# Patient Record
Sex: FEMALE | Race: BLACK OR AFRICAN AMERICAN | NOT HISPANIC OR LATINO | URBAN - METROPOLITAN AREA
[De-identification: names, ages, dates, MRNs, and addresses within clinical notes are randomized per-mention and may not be internally consistent; named-entity substitution may affect disease eponyms.]

---

## 2019-10-22 ENCOUNTER — EMERGENCY (EMERGENCY)
Facility: HOSPITAL | Age: 64
LOS: 0 days | Discharge: HOME | End: 2019-10-22
Attending: EMERGENCY MEDICINE
Payer: COMMERCIAL

## 2019-10-22 VITALS
RESPIRATION RATE: 16 BRPM | DIASTOLIC BLOOD PRESSURE: 86 MMHG | WEIGHT: 164.02 LBS | HEART RATE: 90 BPM | OXYGEN SATURATION: 100 % | TEMPERATURE: 99 F | HEIGHT: 62 IN | SYSTOLIC BLOOD PRESSURE: 152 MMHG

## 2019-10-22 VITALS
SYSTOLIC BLOOD PRESSURE: 117 MMHG | OXYGEN SATURATION: 100 % | HEART RATE: 90 BPM | TEMPERATURE: 99 F | DIASTOLIC BLOOD PRESSURE: 76 MMHG | RESPIRATION RATE: 16 BRPM

## 2019-10-22 DIAGNOSIS — I10 ESSENTIAL (PRIMARY) HYPERTENSION: ICD-10-CM

## 2019-10-22 DIAGNOSIS — E89.0 POSTPROCEDURAL HYPOTHYROIDISM: ICD-10-CM

## 2019-10-22 DIAGNOSIS — R10.30 LOWER ABDOMINAL PAIN, UNSPECIFIED: ICD-10-CM

## 2019-10-22 DIAGNOSIS — R11.10 VOMITING, UNSPECIFIED: ICD-10-CM

## 2019-10-22 DIAGNOSIS — E11.9 TYPE 2 DIABETES MELLITUS WITHOUT COMPLICATIONS: ICD-10-CM

## 2019-10-22 DIAGNOSIS — E78.00 PURE HYPERCHOLESTEROLEMIA, UNSPECIFIED: ICD-10-CM

## 2019-10-22 DIAGNOSIS — N20.0 CALCULUS OF KIDNEY: ICD-10-CM

## 2019-10-22 DIAGNOSIS — Z79.84 LONG TERM (CURRENT) USE OF ORAL HYPOGLYCEMIC DRUGS: ICD-10-CM

## 2019-10-22 LAB
ALBUMIN SERPL ELPH-MCNC: 4.2 G/DL — SIGNIFICANT CHANGE UP (ref 3.3–5)
ALP SERPL-CCNC: 129 U/L — HIGH (ref 40–120)
ALT FLD-CCNC: 23 U/L — SIGNIFICANT CHANGE UP (ref 12–78)
ANION GAP SERPL CALC-SCNC: 9 MMOL/L — SIGNIFICANT CHANGE UP (ref 5–17)
APPEARANCE UR: ABNORMAL
AST SERPL-CCNC: 19 U/L — SIGNIFICANT CHANGE UP (ref 15–37)
BACTERIA # UR AUTO: ABNORMAL
BASOPHILS # BLD AUTO: 0.06 K/UL — SIGNIFICANT CHANGE UP (ref 0–0.2)
BASOPHILS NFR BLD AUTO: 0.5 % — SIGNIFICANT CHANGE UP (ref 0–2)
BILIRUB SERPL-MCNC: 0.9 MG/DL — SIGNIFICANT CHANGE UP (ref 0.2–1.2)
BILIRUB UR-MCNC: NEGATIVE — SIGNIFICANT CHANGE UP
BUN SERPL-MCNC: 16 MG/DL — SIGNIFICANT CHANGE UP (ref 7–23)
CALCIUM SERPL-MCNC: 9.6 MG/DL — SIGNIFICANT CHANGE UP (ref 8.5–10.1)
CHLORIDE SERPL-SCNC: 115 MMOL/L — HIGH (ref 96–108)
CO2 SERPL-SCNC: 21 MMOL/L — LOW (ref 22–31)
COLOR SPEC: YELLOW — SIGNIFICANT CHANGE UP
COMMENT - URINE: SIGNIFICANT CHANGE UP
CREAT SERPL-MCNC: 0.92 MG/DL — SIGNIFICANT CHANGE UP (ref 0.5–1.3)
DIFF PNL FLD: ABNORMAL
EOSINOPHIL # BLD AUTO: 0.01 K/UL — SIGNIFICANT CHANGE UP (ref 0–0.5)
EOSINOPHIL NFR BLD AUTO: 0.1 % — SIGNIFICANT CHANGE UP (ref 0–6)
EPI CELLS # UR: SIGNIFICANT CHANGE UP
GLUCOSE BLDC GLUCOMTR-MCNC: 151 MG/DL — HIGH (ref 70–99)
GLUCOSE SERPL-MCNC: 145 MG/DL — HIGH (ref 70–99)
GLUCOSE UR QL: NEGATIVE MG/DL — SIGNIFICANT CHANGE UP
HCT VFR BLD CALC: 40.2 % — SIGNIFICANT CHANGE UP (ref 34.5–45)
HGB BLD-MCNC: 13 G/DL — SIGNIFICANT CHANGE UP (ref 11.5–15.5)
IMM GRANULOCYTES NFR BLD AUTO: 0.3 % — SIGNIFICANT CHANGE UP (ref 0–1.5)
KETONES UR-MCNC: ABNORMAL
LEUKOCYTE ESTERASE UR-ACNC: ABNORMAL
LYMPHOCYTES # BLD AUTO: 1.99 K/UL — SIGNIFICANT CHANGE UP (ref 1–3.3)
LYMPHOCYTES # BLD AUTO: 17.5 % — SIGNIFICANT CHANGE UP (ref 13–44)
MCHC RBC-ENTMCNC: 27.8 PG — SIGNIFICANT CHANGE UP (ref 27–34)
MCHC RBC-ENTMCNC: 32.3 GM/DL — SIGNIFICANT CHANGE UP (ref 32–36)
MCV RBC AUTO: 85.9 FL — SIGNIFICANT CHANGE UP (ref 80–100)
MONOCYTES # BLD AUTO: 0.69 K/UL — SIGNIFICANT CHANGE UP (ref 0–0.9)
MONOCYTES NFR BLD AUTO: 6.1 % — SIGNIFICANT CHANGE UP (ref 2–14)
NEUTROPHILS # BLD AUTO: 8.62 K/UL — HIGH (ref 1.8–7.4)
NEUTROPHILS NFR BLD AUTO: 75.5 % — SIGNIFICANT CHANGE UP (ref 43–77)
NITRITE UR-MCNC: NEGATIVE — SIGNIFICANT CHANGE UP
NRBC # BLD: 0 /100 WBCS — SIGNIFICANT CHANGE UP (ref 0–0)
PH UR: 8 — SIGNIFICANT CHANGE UP (ref 5–8)
PLATELET # BLD AUTO: 368 K/UL — SIGNIFICANT CHANGE UP (ref 150–400)
POTASSIUM SERPL-MCNC: 3.3 MMOL/L — LOW (ref 3.5–5.3)
POTASSIUM SERPL-SCNC: 3.3 MMOL/L — LOW (ref 3.5–5.3)
PROT SERPL-MCNC: 8.1 GM/DL — SIGNIFICANT CHANGE UP (ref 6–8.3)
PROT UR-MCNC: NEGATIVE MG/DL — SIGNIFICANT CHANGE UP
RBC # BLD: 4.68 M/UL — SIGNIFICANT CHANGE UP (ref 3.8–5.2)
RBC # FLD: 12.9 % — SIGNIFICANT CHANGE UP (ref 10.3–14.5)
RBC CASTS # UR COMP ASSIST: ABNORMAL /HPF (ref 0–4)
SODIUM SERPL-SCNC: 145 MMOL/L — SIGNIFICANT CHANGE UP (ref 135–145)
SP GR SPEC: 1.01 — SIGNIFICANT CHANGE UP (ref 1.01–1.02)
UROBILINOGEN FLD QL: NEGATIVE MG/DL — SIGNIFICANT CHANGE UP
WBC # BLD: 11.4 K/UL — HIGH (ref 3.8–10.5)
WBC # FLD AUTO: 11.4 K/UL — HIGH (ref 3.8–10.5)
WBC UR QL: ABNORMAL

## 2019-10-22 PROCEDURE — 99285 EMERGENCY DEPT VISIT HI MDM: CPT

## 2019-10-22 PROCEDURE — 74176 CT ABD & PELVIS W/O CONTRAST: CPT | Mod: 26

## 2019-10-22 RX ORDER — ONDANSETRON 8 MG/1
4 TABLET, FILM COATED ORAL ONCE
Refills: 0 | Status: COMPLETED | OUTPATIENT
Start: 2019-10-22 | End: 2019-10-22

## 2019-10-22 RX ORDER — RIVAROXABAN 15 MG-20MG
15 KIT ORAL ONCE
Refills: 0 | Status: DISCONTINUED | OUTPATIENT
Start: 2019-10-22 | End: 2019-10-22

## 2019-10-22 RX ORDER — SODIUM CHLORIDE 9 MG/ML
1000 INJECTION INTRAMUSCULAR; INTRAVENOUS; SUBCUTANEOUS ONCE
Refills: 0 | Status: COMPLETED | OUTPATIENT
Start: 2019-10-22 | End: 2019-10-22

## 2019-10-22 RX ORDER — MORPHINE SULFATE 50 MG/1
4 CAPSULE, EXTENDED RELEASE ORAL ONCE
Refills: 0 | Status: DISCONTINUED | OUTPATIENT
Start: 2019-10-22 | End: 2019-10-22

## 2019-10-22 RX ORDER — ONDANSETRON 8 MG/1
1 TABLET, FILM COATED ORAL
Qty: 9 | Refills: 0
Start: 2019-10-22 | End: 2019-10-24

## 2019-10-22 RX ORDER — KETOROLAC TROMETHAMINE 30 MG/ML
15 SYRINGE (ML) INJECTION ONCE
Refills: 0 | Status: DISCONTINUED | OUTPATIENT
Start: 2019-10-22 | End: 2019-10-22

## 2019-10-22 RX ORDER — TAMSULOSIN HYDROCHLORIDE 0.4 MG/1
1 CAPSULE ORAL
Qty: 14 | Refills: 0
Start: 2019-10-22 | End: 2019-11-04

## 2019-10-22 RX ORDER — IBUPROFEN 200 MG
1 TABLET ORAL
Qty: 20 | Refills: 0
Start: 2019-10-22 | End: 2019-10-26

## 2019-10-22 RX ADMIN — ONDANSETRON 4 MILLIGRAM(S): 8 TABLET, FILM COATED ORAL at 20:35

## 2019-10-22 RX ADMIN — MORPHINE SULFATE 4 MILLIGRAM(S): 50 CAPSULE, EXTENDED RELEASE ORAL at 21:08

## 2019-10-22 RX ADMIN — Medication 15 MILLIGRAM(S): at 21:10

## 2019-10-22 RX ADMIN — SODIUM CHLORIDE 1000 MILLILITER(S): 9 INJECTION INTRAMUSCULAR; INTRAVENOUS; SUBCUTANEOUS at 22:00

## 2019-10-22 RX ADMIN — MORPHINE SULFATE 4 MILLIGRAM(S): 50 CAPSULE, EXTENDED RELEASE ORAL at 20:38

## 2019-10-22 RX ADMIN — Medication 15 MILLIGRAM(S): at 20:40

## 2019-10-22 RX ADMIN — SODIUM CHLORIDE 1000 MILLILITER(S): 9 INJECTION INTRAMUSCULAR; INTRAVENOUS; SUBCUTANEOUS at 20:40

## 2019-10-22 RX ADMIN — Medication 1 TABLET(S): at 22:12

## 2019-10-22 NOTE — ED ADULT TRIAGE NOTE - CHIEF COMPLAINT QUOTE
pt co right flank pain since this morning. + nausea/vomiting. denies diarrhea. denies dysuria/hematuria. hx. high cholesterol, DM, HTN

## 2019-10-22 NOTE — ED PROVIDER NOTE - PATIENT PORTAL LINK FT
You can access the FollowMyHealth Patient Portal offered by [facility] by registering at the following website: [email or web address]. By joining [employer]’s FollowMyHealth portal, you will also be able to view your health information using other applications (apps) compatible with our system.

## 2019-10-22 NOTE — ED PROVIDER NOTE - PHYSICAL EXAMINATION
Gen: Alert, moderate distress, well appearing  Head: NC, AT, normal lids/conjunctiva  ENT: normal hearing, patent oropharynx without erythema/exudate, uvula midline  Neck: +supple, +Trachea midline  Pulm: Bilateral BS, normal resp effort, no wheeze/stridor/retractions  CV: RRR, no M/R/G, +dist pulses  Abd: soft, NT/ND, Negative [place] signs, +BS, no palpable masses, +right CVA tenderness  Mskel: no edema/erythema/cyanosis  Skin: no rash, warm/dry  Neuro: AAOx3, no apparent sensory/motor deficits, coordination intact

## 2019-10-22 NOTE — ED ADULT NURSE NOTE - SUICIDE SCREENING QUESTION 3
Problem: Patient Care Overview  Goal: Plan of Care Review   09/16/18 1444   Coping/Psychosocial   Plan of Care Reviewed With patient   Plan of Care Review   Progress improving   OTHER   Outcome Summary Pt demonstrates good progress w/mobility. Able to tolerate increased gait distance today despite complaint of dizziness while upright. No new concerns; will continue to increase activity as tolerated.           No

## 2019-10-22 NOTE — ED ADULT NURSE NOTE - OBJECTIVE STATEMENT
pt received c/o right side flank pain onset this morning, pt took BP and BG meds and vomiting all day after eating a small meal. pt reports right glaucoma, left eye lost of peripheral vision, pt cannot recall eye drops taking at home. pt cannot recall BP med at home

## 2019-10-22 NOTE — ED PROVIDER NOTE - OBJECTIVE STATEMENT
Pertinent PMH/PSH/FHx/SHx and Review of Systems contained within:  Patient presents to the ED for right flank pain.  Pain is 10/10, radiating to groin, started this morning at work.  Patient has been having vomiting all day.  Denies dysuria, hematuria, chest pain, dyspnea, diarrhea.  Patient denies EtOH/tobacco/illicit substance use.    ROS: No fever/chills, No headache/photophobia/eye pain/changes in vision, No ear pain/sore throat/dysphagia, No chest pain/palpitations, no SOB/cough/wheeze/stridor, No D/melena, no dysuria/frequency/discharge, No neck pain, no rash, no changes in neurological status/function.

## 2019-10-22 NOTE — ED PROVIDER NOTE - CLINICAL SUMMARY MEDICAL DECISION MAKING FREE TEXT BOX
Patient with right flank pain, found to have large renal calculus.  VSS.  UA suggestive of infection vs contamination, will antibiose.  Pain currently 1/10, sleeping comfortably.  Will DC with pain meds, bactrim, flomax, urology follow up.  Looks well, nontoxic.  Advised to drink plenty of water.  Patient also informed on hypodense lesion of kidney, advised to follow up with PMD.  Discussed results and outcome of today's visit with the patient.  Patient advised to please follow up with another healthcare provider within the next 24 hours and return to the Emergency Department for worsening symptoms or any other concerns.  Patient advised that their doctor may call  to follow up on the specific results of the tests performed today in the emergency department.   Patient appears well on discharge.

## 2019-10-24 LAB
CULTURE RESULTS: SIGNIFICANT CHANGE UP
SPECIMEN SOURCE: SIGNIFICANT CHANGE UP

## 2019-11-04 ENCOUNTER — APPOINTMENT (OUTPATIENT)
Dept: UROLOGY | Facility: CLINIC | Age: 64
End: 2019-11-04
Payer: COMMERCIAL

## 2019-11-04 VITALS
WEIGHT: 164 LBS | BODY MASS INDEX: 30.18 KG/M2 | HEIGHT: 62 IN | DIASTOLIC BLOOD PRESSURE: 75 MMHG | HEART RATE: 83 BPM | SYSTOLIC BLOOD PRESSURE: 126 MMHG

## 2019-11-04 DIAGNOSIS — Z78.9 OTHER SPECIFIED HEALTH STATUS: ICD-10-CM

## 2019-11-04 PROCEDURE — 99204 OFFICE O/P NEW MOD 45 MIN: CPT

## 2019-11-04 RX ORDER — TELMISARTAN AND HYDROCHLOROTHIAZIDE 40; 12.5 MG/1; MG/1
40-12.5 TABLET ORAL
Refills: 0 | Status: ACTIVE | COMMUNITY

## 2019-11-04 RX ORDER — ATORVASTATIN CALCIUM 80 MG/1
TABLET, FILM COATED ORAL
Refills: 0 | Status: ACTIVE | COMMUNITY

## 2019-11-04 NOTE — REVIEW OF SYSTEMS
[Eyesight Problems] : eyesight problems [Heart Rate Is Fast] : fast heart rate [see HPI] : see HPI [both] : pain during and after intercourse [denies] : denies pain with orgasm [Negative] : Heme/Lymph

## 2019-11-09 NOTE — ASSESSMENT
[FreeTextEntry1] : 65 yo F with nephrolithiasis, potential renal mass\par \par - Reviewed labwork and CT imaging. Will need imaging with contrast to accurately delineate this renal mass\par - CT urogram\par - Discussed possible etiologies for nephrolithiasis. Reviewed behavioral modifications including adequate hydration, cutting back on coffee, dark sodas, dark teas, low sodium diet, increasing citrate levels with lemon juice. \par - Discussed importance of seeking medical attention should intractable flank pain with nausea, vomiting or fever occur\par - FU after CT\par

## 2019-11-09 NOTE — HISTORY OF PRESENT ILLNESS
[FreeTextEntry1] : 65 yo F presented to the ER with severe right flank pain\par Some nausea and vomiting\par no fever\par no gross hematuria, no dysuria\par CT showed nephrolithiasis and potential renal mass\par Discharged with antibiotics\par Pain still there but not as severe, last took ibuprofen last night\par Drinks 6 bottles of water, 1 cup of coffee daily\par No prior history of stones

## 2019-12-02 ENCOUNTER — APPOINTMENT (OUTPATIENT)
Age: 64
End: 2019-12-02
Payer: COMMERCIAL

## 2019-12-02 PROCEDURE — 99215 OFFICE O/P EST HI 40 MIN: CPT

## 2019-12-06 ENCOUNTER — RESULT REVIEW (OUTPATIENT)
Age: 64
End: 2019-12-06

## 2019-12-10 NOTE — HISTORY OF PRESENT ILLNESS
[FreeTextEntry1] : 65 yo F presented to the ER with severe right flank pain\par Some nausea and vomiting\par no fever\par no gross hematuria, no dysuria\par CT showed nephrolithiasis and potential renal mass\par Discharged with antibiotics\par Pain still there but not as severe, last took ibuprofen last night\par Drinks 6 bottles of water, 1 cup of coffee daily\par No prior history of stones\par \par 12/2/19 Interval history: Pt feeling much better since last visit\par Some discomfort but no need for pain meds\par Here to review recent CT results

## 2019-12-10 NOTE — ASSESSMENT
[FreeTextEntry1] : 63 yo f with nephrolithiasis and renal mass\par \par - Reviewed CT from [facility] - 1cm stone in right renal pelvis as well as an 8mm stone in the interpole (?calyceal tic). Also with 3.5cm lower pole renal mass\par - Discussed findings with pt and daughter. Will need treatment for stone as well as the mass. For the stone, discussed the pros and cons of PCNL vs ureteroscopy with holmium laser lithotripsy. \par - As for the renal mass, reviewed the pros and cons of surveillance vs perc ablation vs surgical extirpation and the risks and benefits of each option. Also discussed timing in relation to stone management. Would recommend stone procedure prior to treating the renal mass. Reassured pt regarding the risk of waiting to treat the renal mass, given its size. \par \par Addendum: Reviewed imaging with colleagues and consensus was to treat the renal pelvic stone with ureteroscopy and holmium laser lithotripsy prior to partial nephrectomy for the renal mass. Discussed plan with pt and pt eager to proceed.

## 2019-12-10 NOTE — PHYSICAL EXAM
[General Appearance - Well Developed] : well developed [General Appearance - Well Nourished] : well nourished [Normal Appearance] : normal appearance [Well Groomed] : well groomed [General Appearance - In No Acute Distress] : no acute distress [Abdomen Soft] : soft [Costovertebral Angle Tenderness] : no ~M costovertebral angle tenderness [Abdomen Tenderness] : non-tender [Urinary Bladder Findings] : the bladder was normal on palpation [] : no respiratory distress [Edema] : no peripheral edema [Oriented To Time, Place, And Person] : oriented to person, place, and time [Respiration, Rhythm And Depth] : normal respiratory rhythm and effort [Exaggerated Use Of Accessory Muscles For Inspiration] : no accessory muscle use [Affect] : the affect was normal [Mood] : the mood was normal [Not Anxious] : not anxious [Normal Station and Gait] : the gait and station were normal for the patient's age [No Focal Deficits] : no focal deficits [No Palpable Adenopathy] : no palpable adenopathy

## 2020-01-14 ENCOUNTER — APPOINTMENT (OUTPATIENT)
Dept: UROLOGY | Facility: HOSPITAL | Age: 65
End: 2020-01-14

## 2020-01-22 ENCOUNTER — OUTPATIENT (OUTPATIENT)
Dept: OUTPATIENT SERVICES | Facility: HOSPITAL | Age: 65
LOS: 1 days | End: 2020-01-22
Payer: COMMERCIAL

## 2020-01-22 VITALS
HEIGHT: 62 IN | DIASTOLIC BLOOD PRESSURE: 74 MMHG | TEMPERATURE: 98 F | WEIGHT: 158.07 LBS | HEART RATE: 78 BPM | SYSTOLIC BLOOD PRESSURE: 148 MMHG | RESPIRATION RATE: 16 BRPM

## 2020-01-22 DIAGNOSIS — Z01.818 ENCOUNTER FOR OTHER PREPROCEDURAL EXAMINATION: ICD-10-CM

## 2020-01-22 DIAGNOSIS — N20.2 CALCULUS OF KIDNEY WITH CALCULUS OF URETER: ICD-10-CM

## 2020-01-22 DIAGNOSIS — Z98.890 OTHER SPECIFIED POSTPROCEDURAL STATES: Chronic | ICD-10-CM

## 2020-01-22 LAB
ANION GAP SERPL CALC-SCNC: 2 MMOL/L — LOW (ref 5–17)
APPEARANCE UR: CLEAR — SIGNIFICANT CHANGE UP
APTT BLD: 30.7 SEC — SIGNIFICANT CHANGE UP (ref 27.5–36.3)
BACTERIA # UR AUTO: ABNORMAL /HPF
BILIRUB UR-MCNC: NEGATIVE — SIGNIFICANT CHANGE UP
BUN SERPL-MCNC: 7 MG/DL — SIGNIFICANT CHANGE UP (ref 7–18)
CALCIUM SERPL-MCNC: 9.2 MG/DL — SIGNIFICANT CHANGE UP (ref 8.4–10.5)
CHLORIDE SERPL-SCNC: 114 MMOL/L — HIGH (ref 96–108)
CO2 SERPL-SCNC: 27 MMOL/L — SIGNIFICANT CHANGE UP (ref 22–31)
COLOR SPEC: YELLOW — SIGNIFICANT CHANGE UP
CREAT SERPL-MCNC: 0.72 MG/DL — SIGNIFICANT CHANGE UP (ref 0.5–1.3)
DIFF PNL FLD: ABNORMAL
EPI CELLS # UR: SIGNIFICANT CHANGE UP /HPF
GLUCOSE SERPL-MCNC: 85 MG/DL — SIGNIFICANT CHANGE UP (ref 70–99)
GLUCOSE UR QL: NEGATIVE — SIGNIFICANT CHANGE UP
HCT VFR BLD CALC: 38.1 % — SIGNIFICANT CHANGE UP (ref 34.5–45)
HGB BLD-MCNC: 11.8 G/DL — SIGNIFICANT CHANGE UP (ref 11.5–15.5)
HYALINE CASTS # UR AUTO: ABNORMAL /LPF
INR BLD: 1.1 RATIO — SIGNIFICANT CHANGE UP (ref 0.88–1.16)
KETONES UR-MCNC: NEGATIVE — SIGNIFICANT CHANGE UP
LEUKOCYTE ESTERASE UR-ACNC: ABNORMAL
MCHC RBC-ENTMCNC: 27.5 PG — SIGNIFICANT CHANGE UP (ref 27–34)
MCHC RBC-ENTMCNC: 31 GM/DL — LOW (ref 32–36)
MCV RBC AUTO: 88.8 FL — SIGNIFICANT CHANGE UP (ref 80–100)
NITRITE UR-MCNC: NEGATIVE — SIGNIFICANT CHANGE UP
NRBC # BLD: 0 /100 WBCS — SIGNIFICANT CHANGE UP (ref 0–0)
PH UR: 7 — SIGNIFICANT CHANGE UP (ref 5–8)
PLATELET # BLD AUTO: 352 K/UL — SIGNIFICANT CHANGE UP (ref 150–400)
POTASSIUM SERPL-MCNC: 3.1 MMOL/L — LOW (ref 3.5–5.3)
POTASSIUM SERPL-SCNC: 3.1 MMOL/L — LOW (ref 3.5–5.3)
PROT UR-MCNC: 30 MG/DL
PROTHROM AB SERPL-ACNC: 12.3 SEC — SIGNIFICANT CHANGE UP (ref 10–12.9)
RBC # BLD: 4.29 M/UL — SIGNIFICANT CHANGE UP (ref 3.8–5.2)
RBC # FLD: 13.4 % — SIGNIFICANT CHANGE UP (ref 10.3–14.5)
RBC CASTS # UR COMP ASSIST: ABNORMAL /HPF (ref 0–2)
SODIUM SERPL-SCNC: 143 MMOL/L — SIGNIFICANT CHANGE UP (ref 135–145)
SP GR SPEC: 1.01 — SIGNIFICANT CHANGE UP (ref 1.01–1.02)
TSH SERPL-MCNC: 1.44 UU/ML — SIGNIFICANT CHANGE UP (ref 0.34–4.82)
UROBILINOGEN FLD QL: NEGATIVE — SIGNIFICANT CHANGE UP
WBC # BLD: 6.92 K/UL — SIGNIFICANT CHANGE UP (ref 3.8–10.5)
WBC # FLD AUTO: 6.92 K/UL — SIGNIFICANT CHANGE UP (ref 3.8–10.5)
WBC UR QL: ABNORMAL /HPF (ref 0–5)

## 2020-01-22 PROCEDURE — 87086 URINE CULTURE/COLONY COUNT: CPT

## 2020-01-22 PROCEDURE — G0463: CPT

## 2020-01-22 NOTE — H&P PST ADULT - ASSESSMENT
63 y/o female with h/o HTN, DM, and thyroidectomy with diagnois of calculus kidney and ureter right is  scheduled for Right Retrograde pyelogram Right ureteroscopy with lithotripsy and ureteral stent placement on 1/28/2020

## 2020-01-22 NOTE — H&P PST ADULT - NSICDXPROBLEM_GEN_ALL_CORE_FT
PROBLEM DIAGNOSES  Problem: Calculus of kidney and ureter  Assessment and Plan: Cystoscopy, , right retrograde ureteroscopy, with lithotripsyand ureter stent placement

## 2020-01-22 NOTE — H&P PST ADULT - NSICDXPASTMEDICALHX_GEN_ALL_CORE_FT
PAST MEDICAL HISTORY:  Calculus of kidney and ureter     Diabetes     Glaucoma     HTN (Hypertension)     Hypercholesterolemia

## 2020-01-22 NOTE — H&P PST ADULT - HISTORY OF PRESENT ILLNESS
63 y/o female with h/o HTN and well controlled  on medications presented with h/o kidney stones in the right kidney. Pt had severe right flank pain and went to the emergency room, had a CT scan and showed calculus of kidney and ureter. Pt is scheduled for Right Retrograde pyelogram Right ureteroscopy with lithotripsy and ureteral stent placement on 1/28/2020

## 2020-01-22 NOTE — H&P PST ADULT - RS GEN PE MLT RESP DETAILS PC
no chest wall tenderness/normal/breath sounds equal/no intercostal retractions/airway patent/good air movement

## 2020-01-23 LAB
CULTURE RESULTS: SIGNIFICANT CHANGE UP
SPECIMEN SOURCE: SIGNIFICANT CHANGE UP

## 2020-01-27 ENCOUNTER — FORM ENCOUNTER (OUTPATIENT)
Age: 65
End: 2020-01-27

## 2020-01-28 ENCOUNTER — OUTPATIENT (OUTPATIENT)
Dept: OUTPATIENT SERVICES | Facility: HOSPITAL | Age: 65
LOS: 1 days | End: 2020-01-28
Payer: COMMERCIAL

## 2020-01-28 ENCOUNTER — APPOINTMENT (OUTPATIENT)
Age: 65
End: 2020-01-28

## 2020-01-28 ENCOUNTER — RESULT REVIEW (OUTPATIENT)
Age: 65
End: 2020-01-28

## 2020-01-28 VITALS
HEIGHT: 62 IN | TEMPERATURE: 98 F | WEIGHT: 158.07 LBS | RESPIRATION RATE: 16 BRPM | DIASTOLIC BLOOD PRESSURE: 71 MMHG | HEART RATE: 86 BPM | SYSTOLIC BLOOD PRESSURE: 126 MMHG | OXYGEN SATURATION: 99 %

## 2020-01-28 VITALS
HEART RATE: 91 BPM | RESPIRATION RATE: 14 BRPM | TEMPERATURE: 98 F | DIASTOLIC BLOOD PRESSURE: 77 MMHG | SYSTOLIC BLOOD PRESSURE: 138 MMHG

## 2020-01-28 DIAGNOSIS — Z98.890 OTHER SPECIFIED POSTPROCEDURAL STATES: Chronic | ICD-10-CM

## 2020-01-28 DIAGNOSIS — N20.2 CALCULUS OF KIDNEY WITH CALCULUS OF URETER: ICD-10-CM

## 2020-01-28 PROCEDURE — 74420 UROGRAPHY RTRGR +-KUB: CPT | Mod: 26

## 2020-01-28 PROCEDURE — C1889: CPT

## 2020-01-28 PROCEDURE — C2617: CPT

## 2020-01-28 PROCEDURE — 52356 CYSTO/URETERO W/LITHOTRIPSY: CPT | Mod: RT

## 2020-01-28 PROCEDURE — 82365 CALCULUS SPECTROSCOPY: CPT

## 2020-01-28 PROCEDURE — 88300 SURGICAL PATH GROSS: CPT

## 2020-01-28 PROCEDURE — 82962 GLUCOSE BLOOD TEST: CPT

## 2020-01-28 PROCEDURE — C1758: CPT

## 2020-01-28 PROCEDURE — 88300 SURGICAL PATH GROSS: CPT | Mod: 26

## 2020-01-28 PROCEDURE — 76000 FLUOROSCOPY <1 HR PHYS/QHP: CPT

## 2020-01-28 RX ORDER — SODIUM CHLORIDE 9 MG/ML
1000 INJECTION, SOLUTION INTRAVENOUS
Refills: 0 | Status: DISCONTINUED | OUTPATIENT
Start: 2020-01-28 | End: 2020-01-28

## 2020-01-28 RX ORDER — ONDANSETRON 8 MG/1
4 TABLET, FILM COATED ORAL ONCE
Refills: 0 | Status: DISCONTINUED | OUTPATIENT
Start: 2020-01-28 | End: 2020-02-05

## 2020-01-28 RX ORDER — ACETAMINOPHEN 500 MG
1000 TABLET ORAL ONCE
Refills: 0 | Status: DISCONTINUED | OUTPATIENT
Start: 2020-01-28 | End: 2020-02-05

## 2020-01-28 RX ORDER — AZTREONAM 2 G
1 VIAL (EA) INJECTION
Qty: 6 | Refills: 0
Start: 2020-01-28 | End: 2020-01-30

## 2020-01-28 RX ORDER — FENTANYL CITRATE 50 UG/ML
25 INJECTION INTRAVENOUS
Refills: 0 | Status: DISCONTINUED | OUTPATIENT
Start: 2020-01-28 | End: 2020-01-28

## 2020-01-28 RX ORDER — ONDANSETRON 8 MG/1
4 TABLET, FILM COATED ORAL ONCE
Refills: 0 | Status: DISCONTINUED | OUTPATIENT
Start: 2020-01-28 | End: 2020-01-28

## 2020-01-28 RX ORDER — SODIUM CHLORIDE 9 MG/ML
3 INJECTION INTRAMUSCULAR; INTRAVENOUS; SUBCUTANEOUS EVERY 8 HOURS
Refills: 0 | Status: DISCONTINUED | OUTPATIENT
Start: 2020-01-28 | End: 2020-01-28

## 2020-01-28 NOTE — ASU DISCHARGE PLAN (ADULT/PEDIATRIC) - PAIN MANAGEMENT
Take over the counter pain medication Prescriptions electronically submitted to pharmacy from [place]/Take over the counter pain medication

## 2020-01-28 NOTE — BRIEF OPERATIVE NOTE - NSICDXBRIEFPROCEDURE_GEN_ALL_CORE_FT
PROCEDURES:  Retrograde pyelogram 28-Jan-2020 12:12:11  Elizabeth King  Cystoureteroscopy, with nephroscopy, lithotripsy using laser, and ureteral stent insertion 28-Jan-2020 12:12:00  Elizabeth King

## 2020-01-28 NOTE — ASU DISCHARGE PLAN (ADULT/PEDIATRIC) - CARE PROVIDER_API CALL
Eimly Martinez (MD; MPH)  Urology  [address] [place] [address]  [place] [address]  Phone: [phone]  Fax: [phone]  Scheduled Appointment: 02/03/2020

## 2020-01-28 NOTE — ASU DISCHARGE PLAN (ADULT/PEDIATRIC) - ASU DC SPECIAL INSTRUCTIONSFT
- may experience continued burning sensation and bleeding with urination, will resolve in a couple of days  - take over the counter pain medications, ie. tylenol or ibuprofen, as directed  - take antibiotics as instructed   - if increasing lower abdominal pain associated with inability to urinate, prolonged blood in urine associated with dizziness, or fever/chills, seek medical care immediately  - follow up with surgeon on next Monday (2/3), call to schedule/confirm an appointment - may experience continued burning sensation and bleeding with urination, will resolve in a couple of days  - take over the counter pain medications, ie. Tylenol or ibuprofen, as directed  - take antibiotics as instructed   - if increasing lower abdominal pain associated with inability to urinate, prolonged blood in urine associated with dizziness, or fever/chills, seek medical care immediately  - follow up with surgeon on next Monday (2/3), call to schedule/confirm an appointment

## 2020-01-28 NOTE — ASU DISCHARGE PLAN (ADULT/PEDIATRIC) - PROCEDURE
Cystoureteroscopy, laser lithotripsy of right renal calculi, right ureteral stent insertion Cysto-ureteroscopy, laser lithotripsy of right renal calculi, right ureteral stent insertion

## 2020-01-28 NOTE — ASU DISCHARGE PLAN (ADULT/PEDIATRIC) - CALL YOUR DOCTOR IF YOU HAVE ANY OF THE FOLLOWING:
Unable to urinate/Fever greater than (need to indicate Fahrenheit or Celsius) Unable to urinate/Bleeding that does not stop/Fever greater than (need to indicate Fahrenheit or Celsius)

## 2020-01-29 PROBLEM — H40.9 UNSPECIFIED GLAUCOMA: Chronic | Status: ACTIVE | Noted: 2020-01-22

## 2020-01-29 PROBLEM — N20.2 CALCULUS OF KIDNEY WITH CALCULUS OF URETER: Chronic | Status: ACTIVE | Noted: 2020-01-22

## 2020-02-03 ENCOUNTER — APPOINTMENT (OUTPATIENT)
Age: 65
End: 2020-02-03
Payer: COMMERCIAL

## 2020-02-03 VITALS
HEIGHT: 62 IN | SYSTOLIC BLOOD PRESSURE: 140 MMHG | RESPIRATION RATE: 17 BRPM | WEIGHT: 165 LBS | BODY MASS INDEX: 30.36 KG/M2 | HEART RATE: 84 BPM | DIASTOLIC BLOOD PRESSURE: 80 MMHG

## 2020-02-03 PROCEDURE — 52310 CYSTOSCOPY AND TREATMENT: CPT

## 2020-02-06 LAB
APPEARANCE: ABNORMAL
BACTERIA UR CULT: NORMAL
BACTERIA: NEGATIVE
BILIRUBIN URINE: NEGATIVE
BLOOD URINE: ABNORMAL
COLOR: COLORLESS
GLUCOSE QUALITATIVE U: NEGATIVE
GRANULAR CASTS: 1 /LPF
HYALINE CASTS: 2 /LPF
KETONES URINE: NEGATIVE
LEUKOCYTE ESTERASE URINE: ABNORMAL
MICROSCOPIC-UA: NORMAL
NITRITE URINE: NEGATIVE
PH URINE: 7.5
PROTEIN URINE: ABNORMAL
RED BLOOD CELLS URINE: 152 /HPF
SPECIFIC GRAVITY URINE: 1
SQUAMOUS EPITHELIAL CELLS: 2 /HPF
UROBILINOGEN URINE: NORMAL
WHITE BLOOD CELLS URINE: 18 /HPF

## 2020-02-13 ENCOUNTER — APPOINTMENT (OUTPATIENT)
Age: 65
End: 2020-02-13
Payer: COMMERCIAL

## 2020-02-13 VITALS
SYSTOLIC BLOOD PRESSURE: 135 MMHG | WEIGHT: 165 LBS | BODY MASS INDEX: 30.36 KG/M2 | HEART RATE: 94 BPM | DIASTOLIC BLOOD PRESSURE: 71 MMHG | HEIGHT: 62 IN

## 2020-02-13 PROCEDURE — 99214 OFFICE O/P EST MOD 30 MIN: CPT

## 2020-02-16 NOTE — ASSESSMENT
[FreeTextEntry1] : 63 yo F s/p URS/HLL for right kidney stone, right renal mass\par \par - Reviewed options again for renal mass. Discussed risks and benefits of surveillance vs surgical extirpation including partial vs radical nephrectomy. Also discussed open vs minimally invasive. Decision made to proceed with robotic assisted right laparoscopic partial nephrectomy. Will schedule in near future\par - Repeat CT prior to surgery

## 2020-02-16 NOTE — HISTORY OF PRESENT ILLNESS
[FreeTextEntry1] : 63 yo F presented to the ER with severe right flank pain\par Some nausea and vomiting\par no fever\par no gross hematuria, no dysuria\par CT showed nephrolithiasis and potential renal mass\par Discharged with antibiotics\par Pain still there but not as severe, last took ibuprofen last night\par Drinks 6 bottles of water, 1 cup of coffee daily\par No prior history of stones\par \par 12/2/19 Interval history: Pt feeling much better since last visit\par Some discomfort but no need for pain meds\par Here to review recent CT results\par \par 2/13/20 Interval history: Doing well since stent removal\par Did have some flank pain for about 2 days immediately after removal\par Currently feeling well

## 2020-02-16 NOTE — PHYSICAL EXAM
[General Appearance - Well Developed] : well developed [General Appearance - Well Nourished] : well nourished [Normal Appearance] : normal appearance [Well Groomed] : well groomed [General Appearance - In No Acute Distress] : no acute distress [Abdomen Soft] : soft [Abdomen Tenderness] : non-tender [Costovertebral Angle Tenderness] : no ~M costovertebral angle tenderness [Urinary Bladder Findings] : the bladder was normal on palpation [Edema] : no peripheral edema [] : no respiratory distress [Respiration, Rhythm And Depth] : normal respiratory rhythm and effort [Exaggerated Use Of Accessory Muscles For Inspiration] : no accessory muscle use [Oriented To Time, Place, And Person] : oriented to person, place, and time [Not Anxious] : not anxious [Affect] : the affect was normal [Mood] : the mood was normal [No Focal Deficits] : no focal deficits [No Palpable Adenopathy] : no palpable adenopathy [Normal Station and Gait] : the gait and station were normal for the patient's age

## 2020-02-25 ENCOUNTER — OUTPATIENT (OUTPATIENT)
Dept: OUTPATIENT SERVICES | Facility: HOSPITAL | Age: 65
LOS: 1 days | End: 2020-02-25
Payer: COMMERCIAL

## 2020-02-25 VITALS
WEIGHT: 156.09 LBS | HEIGHT: 62 IN | TEMPERATURE: 99 F | SYSTOLIC BLOOD PRESSURE: 144 MMHG | DIASTOLIC BLOOD PRESSURE: 72 MMHG | HEART RATE: 93 BPM | OXYGEN SATURATION: 98 % | RESPIRATION RATE: 16 BRPM

## 2020-02-25 DIAGNOSIS — Z98.890 OTHER SPECIFIED POSTPROCEDURAL STATES: Chronic | ICD-10-CM

## 2020-02-25 DIAGNOSIS — N28.89 OTHER SPECIFIED DISORDERS OF KIDNEY AND URETER: ICD-10-CM

## 2020-02-25 LAB
ALBUMIN SERPL ELPH-MCNC: 4.4 G/DL — SIGNIFICANT CHANGE UP (ref 3.3–5)
ALP SERPL-CCNC: 119 U/L — SIGNIFICANT CHANGE UP (ref 40–120)
ALT FLD-CCNC: 12 U/L — SIGNIFICANT CHANGE UP (ref 4–33)
ANION GAP SERPL CALC-SCNC: 19 MMO/L — HIGH (ref 7–14)
AST SERPL-CCNC: 13 U/L — SIGNIFICANT CHANGE UP (ref 4–32)
BILIRUB SERPL-MCNC: 0.4 MG/DL — SIGNIFICANT CHANGE UP (ref 0.2–1.2)
BUN SERPL-MCNC: 12 MG/DL — SIGNIFICANT CHANGE UP (ref 7–23)
CALCIUM SERPL-MCNC: 9.8 MG/DL — SIGNIFICANT CHANGE UP (ref 8.4–10.5)
CHLORIDE SERPL-SCNC: 105 MMOL/L — SIGNIFICANT CHANGE UP (ref 98–107)
CO2 SERPL-SCNC: 21 MMOL/L — LOW (ref 22–31)
CREAT SERPL-MCNC: 0.81 MG/DL — SIGNIFICANT CHANGE UP (ref 0.5–1.3)
GLUCOSE SERPL-MCNC: 228 MG/DL — HIGH (ref 70–99)
HBA1C BLD-MCNC: 6.4 % — HIGH (ref 4–5.6)
HCT VFR BLD CALC: 38.8 % — SIGNIFICANT CHANGE UP (ref 34.5–45)
HGB BLD-MCNC: 11.3 G/DL — LOW (ref 11.5–15.5)
MCHC RBC-ENTMCNC: 26.7 PG — LOW (ref 27–34)
MCHC RBC-ENTMCNC: 29.1 % — LOW (ref 32–36)
MCV RBC AUTO: 91.5 FL — SIGNIFICANT CHANGE UP (ref 80–100)
NRBC # FLD: 0 K/UL — SIGNIFICANT CHANGE UP (ref 0–0)
PLATELET # BLD AUTO: 353 K/UL — SIGNIFICANT CHANGE UP (ref 150–400)
PMV BLD: 11.1 FL — SIGNIFICANT CHANGE UP (ref 7–13)
POTASSIUM SERPL-MCNC: 2.7 MMOL/L — CRITICAL LOW (ref 3.5–5.3)
POTASSIUM SERPL-SCNC: 2.7 MMOL/L — CRITICAL LOW (ref 3.5–5.3)
PROT SERPL-MCNC: 7.5 G/DL — SIGNIFICANT CHANGE UP (ref 6–8.3)
RBC # BLD: 4.24 M/UL — SIGNIFICANT CHANGE UP (ref 3.8–5.2)
RBC # FLD: 13.7 % — SIGNIFICANT CHANGE UP (ref 10.3–14.5)
SODIUM SERPL-SCNC: 145 MMOL/L — SIGNIFICANT CHANGE UP (ref 135–145)
WBC # BLD: 7.23 K/UL — SIGNIFICANT CHANGE UP (ref 3.8–10.5)
WBC # FLD AUTO: 7.23 K/UL — SIGNIFICANT CHANGE UP (ref 3.8–10.5)

## 2020-02-25 PROCEDURE — 93010 ELECTROCARDIOGRAM REPORT: CPT

## 2020-02-25 RX ORDER — ATORVASTATIN CALCIUM 80 MG/1
0 TABLET, FILM COATED ORAL
Qty: 0 | Refills: 0 | DISCHARGE

## 2020-02-25 RX ORDER — FLUTICASONE PROPIONATE 50 MCG
1 SPRAY, SUSPENSION NASAL
Qty: 0 | Refills: 0 | DISCHARGE

## 2020-02-25 RX ORDER — ACETAZOLAMIDE 250 MG/1
1 TABLET ORAL
Qty: 0 | Refills: 0 | DISCHARGE

## 2020-02-25 RX ORDER — GLIMEPIRIDE 1 MG
0 TABLET ORAL
Qty: 0 | Refills: 0 | DISCHARGE

## 2020-02-25 NOTE — H&P PST ADULT - NEGATIVE BREAST SYMPTOMS
no breast tenderness L/no breast tenderness R/no nipple discharge R/no breast lump L/no nipple discharge L

## 2020-02-25 NOTE — H&P PST ADULT - NEGATIVE ENMT SYMPTOMS
no nasal discharge/no post-nasal discharge/no dry mouth/no vertigo/no sinus symptoms/no hearing difficulty/no abnormal taste sensation/no gum bleeding/no nasal congestion/no ear pain/no nasal obstruction/no tinnitus/no recurrent cold sores/no throat pain/no dysphagia

## 2020-02-25 NOTE — H&P PST ADULT - GASTROINTESTINAL DETAILS
no guarding/no organomegaly/soft/no masses palpable/no bruit/nontender/normal/no rebound tenderness/no rigidity/bowel sounds normal/no distention

## 2020-02-25 NOTE — H&P PST ADULT - NEGATIVE SKIN SYMPTOMS
no itching/no tumor/no hair loss/no rash/no brittle nails/no pitted nails/no dryness/no change in size/color of mole

## 2020-02-25 NOTE — H&P PST ADULT - NEGATIVE GASTROINTESTINAL SYMPTOMS
no nausea/no abdominal pain/no flatulence/no hematochezia/no steatorrhea/no melena/no jaundice/no constipation/no change in bowel habits/no vomiting/no hiccoughs/no diarrhea

## 2020-02-25 NOTE — H&P PST ADULT - NEGATIVE NEUROLOGICAL SYMPTOMS
no difficulty walking/no transient paralysis/no tremors/no vertigo/no loss of sensation/no generalized seizures/no headache/no facial palsy/no focal seizures/no weakness/no paresthesias/no loss of consciousness/no hemiparesis/no syncope/no confusion

## 2020-02-25 NOTE — H&P PST ADULT - HISTORY OF PRESENT ILLNESS
63 y/o female with h/o HTN, Dm, HLD and well controlled  on medications, H/O kidney stones in the right kidney. Pt had severe right flank pain and went to the emergency room, had a CT scan and showed calculus of kidney and ureter as well as a mass to rt kidney. S/P Right Retrograde pyelogram Right ureteroscopy with lithotripsy and ureteral stent placement on 1/28/2020. Now recommended a robotic assisted right laparoscopic partial nephrectomy on 3/3/2020. Preop dx: other specified disorders of kidney and ureter.

## 2020-02-25 NOTE — H&P PST ADULT - NEGATIVE GENERAL GENITOURINARY SYMPTOMS
no urinary hesitancy/no flank pain L/no urine discoloration/no renal colic/no incontinence/no hematuria/no flank pain R/no bladder infections/no dysuria/normal urinary frequency/no nocturia

## 2020-02-25 NOTE — H&P PST ADULT - RS GEN PE MLT RESP DETAILS PC
normal/good air movement/no rhonchi/respirations non-labored/airway patent/clear to auscultation bilaterally/no rales/no wheezes/no subcutaneous emphysema/no chest wall tenderness/no intercostal retractions/breath sounds equal

## 2020-02-25 NOTE — H&P PST ADULT - NEGATIVE MUSCULOSKELETAL SYMPTOMS
no muscle weakness/no neck pain/no arm pain R/no leg pain L/no joint swelling/no leg pain R/no myalgia/no stiffness/no arthralgia/no arthritis/no muscle cramps/no arm pain L/no back pain

## 2020-02-25 NOTE — H&P PST ADULT - ASSESSMENT
65 y/o female with h/o HTN, DM, HLD with Preop dx: other specified disorders of kidney and ureter. Now recommended a robotic assisted right laparoscopic partial nephrectomy on 3/3/2020. 63 y/o female with h/o HTN, DM, HLD with Preop dx: other specified disorders of kidney and ureter. Now recommended a robotic assisted right laparoscopic partial nephrectomy on 3/3/2020.     Plan: Preop instructions provided including npo status, Pepcid and Hibiclens wash. Aware to c/w meds as ordered qam amlodipine and qpm statin, eye drops, vit c and Caltrate as ordered. Aware to stop centrum and any otc meds today. Metformin and Glimepiride continue as ordered and aware to take last doses on 3/2/2020 am, no pm doses and none on 3/3/2020. Verbalized understanding. FS ordered, bw sent, MC done on 2/24/2020 as per pt, requested records, PST to f/u.

## 2020-02-25 NOTE — H&P PST ADULT - NEGATIVE OPHTHALMOLOGIC SYMPTOMS
no irritation L/no diplopia/no discharge R/no pain L/no irritation R/no lacrimation L/no discharge L/no photophobia/no loss of vision R/no scleral injection R/no loss of vision L/no scleral injection L/no blurred vision R/no lacrimation R/no blurred vision L

## 2020-02-25 NOTE — H&P PST ADULT - NEGATIVE PSYCHIATRIC SYMPTOMS
no anxiety/no auditory hallucinations/no memory loss/no mood swings/no agitation/no hyperactivity/no suicidal ideation/no depression/no insomnia/no paranoia/no visual hallucinations

## 2020-02-25 NOTE — H&P PST ADULT - BLOOD TRANSFUSION, PREVIOUS, PROFILE
[de-identified] : We discussed further treatment options. She has significant radiculopathy refractory to 4 months of conservative treatment. We discussed the nature purpose of an L5-S1 decompression for treatment of his symptoms. He will consider this option the let me know how he like to proceed. We have discussed epidural injections as well but he's not interested in this option no

## 2020-02-26 ENCOUNTER — EMERGENCY (EMERGENCY)
Facility: HOSPITAL | Age: 65
LOS: 1 days | Discharge: HOME | End: 2020-02-26
Attending: EMERGENCY MEDICINE | Admitting: EMERGENCY MEDICINE
Payer: COMMERCIAL

## 2020-02-26 VITALS
DIASTOLIC BLOOD PRESSURE: 75 MMHG | SYSTOLIC BLOOD PRESSURE: 126 MMHG | TEMPERATURE: 98 F | RESPIRATION RATE: 18 BRPM | HEIGHT: 62 IN | HEART RATE: 92 BPM | OXYGEN SATURATION: 100 %

## 2020-02-26 DIAGNOSIS — Z98.890 OTHER SPECIFIED POSTPROCEDURAL STATES: Chronic | ICD-10-CM

## 2020-02-26 DIAGNOSIS — E87.6 HYPOKALEMIA: ICD-10-CM

## 2020-02-26 LAB
ANION GAP SERPL CALC-SCNC: 14 MMO/L — SIGNIFICANT CHANGE UP (ref 7–14)
BLD GP AB SCN SERPL QL: NEGATIVE — SIGNIFICANT CHANGE UP
BUN SERPL-MCNC: 15 MG/DL — SIGNIFICANT CHANGE UP (ref 7–23)
CALCIUM SERPL-MCNC: 9 MG/DL — SIGNIFICANT CHANGE UP (ref 8.4–10.5)
CHLORIDE SERPL-SCNC: 110 MMOL/L — HIGH (ref 98–107)
CO2 SERPL-SCNC: 19 MMOL/L — LOW (ref 22–31)
CREAT SERPL-MCNC: 0.78 MG/DL — SIGNIFICANT CHANGE UP (ref 0.5–1.3)
GLUCOSE SERPL-MCNC: 200 MG/DL — HIGH (ref 70–99)
MAGNESIUM SERPL-MCNC: 2 MG/DL — SIGNIFICANT CHANGE UP (ref 1.6–2.6)
PHOSPHATE SERPL-MCNC: 2.6 MG/DL — SIGNIFICANT CHANGE UP (ref 2.5–4.5)
POTASSIUM SERPL-MCNC: 2.9 MMOL/L — CRITICAL LOW (ref 3.5–5.3)
POTASSIUM SERPL-SCNC: 2.9 MMOL/L — CRITICAL LOW (ref 3.5–5.3)
RH IG SCN BLD-IMP: POSITIVE — SIGNIFICANT CHANGE UP
SODIUM SERPL-SCNC: 143 MMOL/L — SIGNIFICANT CHANGE UP (ref 135–145)

## 2020-02-26 PROCEDURE — 99283 EMERGENCY DEPT VISIT LOW MDM: CPT

## 2020-02-26 RX ORDER — POTASSIUM CHLORIDE 20 MEQ
10 PACKET (EA) ORAL
Refills: 0 | Status: COMPLETED | OUTPATIENT
Start: 2020-02-26 | End: 2020-02-26

## 2020-02-26 RX ADMIN — Medication 100 MILLIEQUIVALENT(S): at 22:51

## 2020-02-26 RX ADMIN — Medication 100 MILLIEQUIVALENT(S): at 23:47

## 2020-02-26 RX ADMIN — Medication 100 MILLIEQUIVALENT(S): at 22:00

## 2020-02-26 NOTE — ED ADULT TRIAGE NOTE - CHIEF COMPLAINT QUOTE
Pt was sent in by pre surgical testing for low potassium levels 2.7  Pt has no complaints at this time.

## 2020-02-26 NOTE — ED PROVIDER NOTE - ATTENDING CONTRIBUTION TO CARE
lona: Hx from pt. Had pre-op bloods in prep for surgery to remove renal mass. TOld k was low and sent to ED. pt denies hx of low k in past. not on diuretics.   exam: unremarkable.  labs pre-ED: k=2.7. In past k was as low as 3.1  recc: check k and replete as needed. lona: Hx from pt. Had pre-op bloods in prep for surgery to remove renal mass. TOld k was low and sent to ED. pt denies hx of low k in past. not on diuretics.   exam: unremarkable.  labs pre-ED: k=2.7. In past k was as low as 3.1  recc: check k and replete as needed..

## 2020-02-26 NOTE — ED PROVIDER NOTE - OBJECTIVE STATEMENT
65 yo F with pmhx of HTN, HLD, IDDM presents with outpt hypokalemia without any associated symptoms. labs were performed as part of screening for surgery in March. No hx of similar labs. Denies fever, chills, nausea, vomiting, diarrhea, chest pain, shortness of breath, palpitations, cough, leg swelling, dysuria, numbness/tingling in extremities.

## 2020-02-26 NOTE — ED PROVIDER NOTE - PROGRESS NOTE DETAILS
Resident Gregory: pt informed of the lab results. Instructed to take potassium at home. Return precautions given.

## 2020-02-26 NOTE — ED PROVIDER NOTE - PATIENT PORTAL LINK FT
You can access the FollowMyHealth Patient Portal offered by [facility] by registering at the following website: [email or web address]. By joining [employer]’s FollowMyHealth portal, you will also be able to view your health information using other applications (apps) compatible with our system.

## 2020-02-26 NOTE — ED PROVIDER NOTE - CLINICAL SUMMARY MEDICAL DECISION MAKING FREE TEXT BOX
65 yo F with pmhx of HTN, HLD, IDDM presents with outpt hypokalemia without any associated symptoms. VSS. Repeat labs, replenish potassium if necessary. Likely dispo based on results

## 2020-02-26 NOTE — ED PROVIDER NOTE - PHYSICAL EXAMINATION
Gen: well developed and well nourished, NAD  Eyes: PERRL  ENT: airway patent, mmm  CV: RRR, +S1/S2, no M/R/G  Resp: CTAB, symmetric breath sounds, no W/R/R  GI:  abdomen soft non-distended, NTTP  Extremities - no edema  Neuro: A&Ox3, following commands, speech clear, moving all four extremities spontaneously  Psych: appropriate mood, normal insight

## 2020-02-26 NOTE — ED ADULT TRIAGE NOTE - SPO2 (%)
[facility]  [address], [address]  [place] [address]  [phone]      May 2, 2019      Re: Agatha Canas  [address] [facility] [address]        Agatha has suffered from a long standing wound since the fall of 2013.  She has a wound on her right buttock sized 9 x 6.5 x 3.6 cm with moderate drainage.  Patient needs the prescribed item: dressing aquacel AG extra 4 inches by 5 inches to properly treat and dress the wound.          Sincerely,          Dipti Kasper MD          
100

## 2020-02-26 NOTE — ED PROVIDER NOTE - NSFOLLOWUPINSTRUCTIONS_ED_ALL_ED_FT
1. TAKE ALL MEDICATIONS AS DIRECTED.    2. FOR PAIN OR FEVER YOU CAN TAKE IBUPROFEN (MOTRIN, ADVIL) OR ACETAMINOPHEN (TYLENOL) AS NEEDED, AS DIRECTED ON PACKAGING.  3. FOLLOW UP WITH YOUR PRIMARY DOCTOR WITHIN 5 DAYS AS DIRECTED.  4. IF YOU HAD LABS OR IMAGING DONE, YOU WERE GIVEN COPIES OF ALL LABS AND/OR IMAGING RESULTS FROM YOUR ER VISIT--PLEASE TAKE THEM WITH YOU TO YOUR FOLLOW UP APPOINTMENTS.  5. IF NEEDED, CALL PATIENT ACCESS SERVICES AT [phone] TO FIND A PRIMARY CARE PHYSICIAN.  OR CALL [phone] TO MAKE AN APPOINTMENT WITH THE CLINIC.  6. RETURN TO THE ER FOR ANY WORSENING SYMPTOMS OR CONCERNS.

## 2020-02-27 LAB
ANION GAP SERPL CALC-SCNC: 11 MMO/L — SIGNIFICANT CHANGE UP (ref 7–14)
BUN SERPL-MCNC: 11 MG/DL — SIGNIFICANT CHANGE UP (ref 7–23)
CALCIUM SERPL-MCNC: 8.6 MG/DL — SIGNIFICANT CHANGE UP (ref 8.4–10.5)
CHLORIDE SERPL-SCNC: 113 MMOL/L — HIGH (ref 98–107)
CO2 SERPL-SCNC: 20 MMOL/L — LOW (ref 22–31)
CREAT SERPL-MCNC: 0.71 MG/DL — SIGNIFICANT CHANGE UP (ref 0.5–1.3)
GLUCOSE SERPL-MCNC: 132 MG/DL — HIGH (ref 70–99)
POTASSIUM SERPL-MCNC: 3.2 MMOL/L — LOW (ref 3.5–5.3)
POTASSIUM SERPL-SCNC: 3.2 MMOL/L — LOW (ref 3.5–5.3)
SODIUM SERPL-SCNC: 144 MMOL/L — SIGNIFICANT CHANGE UP (ref 135–145)

## 2020-02-27 RX ORDER — POTASSIUM CHLORIDE 20 MEQ
2 PACKET (EA) ORAL
Qty: 4 | Refills: 0
Start: 2020-02-27 | End: 2020-02-28

## 2020-02-27 RX ORDER — SODIUM CHLORIDE 9 MG/ML
1000 INJECTION INTRAMUSCULAR; INTRAVENOUS; SUBCUTANEOUS ONCE
Refills: 0 | Status: COMPLETED | OUTPATIENT
Start: 2020-02-27 | End: 2020-02-27

## 2020-02-27 RX ADMIN — SODIUM CHLORIDE 1000 MILLILITER(S): 9 INJECTION INTRAMUSCULAR; INTRAVENOUS; SUBCUTANEOUS at 00:50

## 2020-02-27 NOTE — ED ADULT NURSE NOTE - OBJECTIVE STATEMENT
pt received alert and oriented x3. pt was sent in by pmd for low potassium. pt declines any pain.  nsr on cm. 20g placed in right forearm. labs drawn and sent. Call bell in reach, warm blanket provided, bed in lowest position, side rails up x2,safety maintained. will continue to monitor.

## 2020-02-27 NOTE — ED ADULT NURSE NOTE - NSIMPLEMENTINTERV_GEN_ALL_ED
Implemented All Universal Safety Interventions:  [place] to call system. Call bell, personal items and telephone within reach. Instruct patient to call for assistance. Room bathroom lighting operational. Non-slip footwear when patient is off stretcher. Physically safe environment: no spills, clutter or unnecessary equipment. Stretcher in lowest position, wheels locked, appropriate side rails in place.

## 2020-03-02 ENCOUNTER — TRANSCRIPTION ENCOUNTER (OUTPATIENT)
Age: 65
End: 2020-03-02

## 2020-03-03 ENCOUNTER — APPOINTMENT (OUTPATIENT)
Age: 65
End: 2020-03-03

## 2020-03-03 ENCOUNTER — RESULT REVIEW (OUTPATIENT)
Age: 65
End: 2020-03-03

## 2020-03-03 ENCOUNTER — INPATIENT (INPATIENT)
Facility: HOSPITAL | Age: 65
LOS: 1 days | Discharge: HOME | End: 2020-03-05
Attending: UROLOGY | Admitting: UROLOGY
Payer: COMMERCIAL

## 2020-03-03 VITALS
HEIGHT: 62 IN | DIASTOLIC BLOOD PRESSURE: 80 MMHG | OXYGEN SATURATION: 99 % | RESPIRATION RATE: 15 BRPM | HEART RATE: 84 BPM | SYSTOLIC BLOOD PRESSURE: 145 MMHG | TEMPERATURE: 98 F | WEIGHT: 156.09 LBS

## 2020-03-03 DIAGNOSIS — Z98.890 OTHER SPECIFIED POSTPROCEDURAL STATES: Chronic | ICD-10-CM

## 2020-03-03 DIAGNOSIS — Z09 ENCOUNTER FOR FOLLOW-UP EXAMINATION AFTER COMPLETED TREATMENT FOR CONDITIONS OTHER THAN MALIGNANT NEOPLASM: ICD-10-CM

## 2020-03-03 DIAGNOSIS — N28.89 OTHER SPECIFIED DISORDERS OF KIDNEY AND URETER: ICD-10-CM

## 2020-03-03 LAB
ANION GAP SERPL CALC-SCNC: 13 MMO/L — SIGNIFICANT CHANGE UP (ref 7–14)
BUN SERPL-MCNC: 11 MG/DL — SIGNIFICANT CHANGE UP (ref 7–23)
CALCIUM SERPL-MCNC: 8.5 MG/DL — SIGNIFICANT CHANGE UP (ref 8.4–10.5)
CHLORIDE SERPL-SCNC: 108 MMOL/L — HIGH (ref 98–107)
CO2 SERPL-SCNC: 22 MMOL/L — SIGNIFICANT CHANGE UP (ref 22–31)
CREAT SERPL-MCNC: 0.76 MG/DL — SIGNIFICANT CHANGE UP (ref 0.5–1.3)
GLUCOSE BLDV-MCNC: 128 MG/DL — HIGH (ref 70–99)
GLUCOSE SERPL-MCNC: 198 MG/DL — HIGH (ref 70–99)
MAGNESIUM SERPL-MCNC: 1.7 MG/DL — SIGNIFICANT CHANGE UP (ref 1.6–2.6)
PHOSPHATE SERPL-MCNC: 3.8 MG/DL — SIGNIFICANT CHANGE UP (ref 2.5–4.5)
POTASSIUM BLDV-SCNC: 3.2 MMOL/L — LOW (ref 3.4–4.5)
POTASSIUM SERPL-MCNC: 2.8 MMOL/L — CRITICAL LOW (ref 3.5–5.3)
POTASSIUM SERPL-SCNC: 2.8 MMOL/L — CRITICAL LOW (ref 3.5–5.3)
RH IG SCN BLD-IMP: POSITIVE — SIGNIFICANT CHANGE UP
SODIUM SERPL-SCNC: 143 MMOL/L — SIGNIFICANT CHANGE UP (ref 135–145)

## 2020-03-03 PROCEDURE — 88331 PATH CONSLTJ SURG 1 BLK 1SPC: CPT | Mod: 26

## 2020-03-03 PROCEDURE — 50543 LAPARO PARTIAL NEPHRECTOMY: CPT | Mod: RT

## 2020-03-03 PROCEDURE — 88312 SPECIAL STAINS GROUP 1: CPT | Mod: 26

## 2020-03-03 PROCEDURE — 52332 CYSTOSCOPY AND TREATMENT: CPT | Mod: RT

## 2020-03-03 PROCEDURE — 88307 TISSUE EXAM BY PATHOLOGIST: CPT | Mod: 26

## 2020-03-03 PROCEDURE — 88305 TISSUE EXAM BY PATHOLOGIST: CPT | Mod: 26

## 2020-03-03 RX ORDER — DORZOLAMIDE HYDROCHLORIDE TIMOLOL MALEATE 20; 5 MG/ML; MG/ML
1 SOLUTION/ DROPS OPHTHALMIC
Refills: 0 | Status: DISCONTINUED | OUTPATIENT
Start: 2020-03-03 | End: 2020-03-05

## 2020-03-03 RX ORDER — ATORVASTATIN CALCIUM 80 MG/1
20 TABLET, FILM COATED ORAL AT BEDTIME
Refills: 0 | Status: DISCONTINUED | OUTPATIENT
Start: 2020-03-03 | End: 2020-03-05

## 2020-03-03 RX ORDER — BRIMONIDINE TARTRATE 2 MG/MG
1 SOLUTION/ DROPS OPHTHALMIC THREE TIMES A DAY
Refills: 0 | Status: DISCONTINUED | OUTPATIENT
Start: 2020-03-03 | End: 2020-03-04

## 2020-03-03 RX ORDER — SENNA PLUS 8.6 MG/1
2 TABLET ORAL AT BEDTIME
Refills: 0 | Status: DISCONTINUED | OUTPATIENT
Start: 2020-03-03 | End: 2020-03-05

## 2020-03-03 RX ORDER — SODIUM CHLORIDE 9 MG/ML
1000 INJECTION, SOLUTION INTRAVENOUS
Refills: 0 | Status: DISCONTINUED | OUTPATIENT
Start: 2020-03-03 | End: 2020-03-04

## 2020-03-03 RX ORDER — POTASSIUM CHLORIDE 20 MEQ
10 PACKET (EA) ORAL
Refills: 0 | Status: COMPLETED | OUTPATIENT
Start: 2020-03-03 | End: 2020-03-03

## 2020-03-03 RX ORDER — POTASSIUM CHLORIDE 20 MEQ
20 PACKET (EA) ORAL
Refills: 0 | Status: DISCONTINUED | OUTPATIENT
Start: 2020-03-03 | End: 2020-03-05

## 2020-03-03 RX ORDER — INSULIN LISPRO 100/ML
VIAL (ML) SUBCUTANEOUS
Refills: 0 | Status: DISCONTINUED | OUTPATIENT
Start: 2020-03-03 | End: 2020-03-05

## 2020-03-03 RX ORDER — ONDANSETRON 8 MG/1
4 TABLET, FILM COATED ORAL ONCE
Refills: 0 | Status: DISCONTINUED | OUTPATIENT
Start: 2020-03-03 | End: 2020-03-05

## 2020-03-03 RX ORDER — LATANOPROST 0.05 MG/ML
1 SOLUTION/ DROPS OPHTHALMIC; TOPICAL AT BEDTIME
Refills: 0 | Status: DISCONTINUED | OUTPATIENT
Start: 2020-03-03 | End: 2020-03-04

## 2020-03-03 RX ORDER — HYDROMORPHONE HYDROCHLORIDE 2 MG/ML
0.5 INJECTION INTRAMUSCULAR; INTRAVENOUS; SUBCUTANEOUS
Refills: 0 | Status: DISCONTINUED | OUTPATIENT
Start: 2020-03-03 | End: 2020-03-04

## 2020-03-03 RX ORDER — ACETAMINOPHEN 500 MG
650 TABLET ORAL EVERY 6 HOURS
Refills: 0 | Status: DISCONTINUED | OUTPATIENT
Start: 2020-03-03 | End: 2020-03-05

## 2020-03-03 RX ORDER — OXYCODONE AND ACETAMINOPHEN 5; 325 MG/1; MG/1
2 TABLET ORAL EVERY 6 HOURS
Refills: 0 | Status: DISCONTINUED | OUTPATIENT
Start: 2020-03-03 | End: 2020-03-04

## 2020-03-03 RX ORDER — AMLODIPINE BESYLATE 2.5 MG/1
10 TABLET ORAL DAILY
Refills: 0 | Status: DISCONTINUED | OUTPATIENT
Start: 2020-03-03 | End: 2020-03-05

## 2020-03-03 RX ORDER — INSULIN LISPRO 100/ML
VIAL (ML) SUBCUTANEOUS AT BEDTIME
Refills: 0 | Status: DISCONTINUED | OUTPATIENT
Start: 2020-03-03 | End: 2020-03-05

## 2020-03-03 RX ORDER — OXYCODONE AND ACETAMINOPHEN 5; 325 MG/1; MG/1
1 TABLET ORAL EVERY 4 HOURS
Refills: 0 | Status: DISCONTINUED | OUTPATIENT
Start: 2020-03-03 | End: 2020-03-04

## 2020-03-03 RX ORDER — POTASSIUM CHLORIDE 20 MEQ
20 PACKET (EA) ORAL
Refills: 0 | Status: DISCONTINUED | OUTPATIENT
Start: 2020-03-03 | End: 2020-03-03

## 2020-03-03 RX ORDER — HEPARIN SODIUM 5000 [USP'U]/ML
5000 INJECTION INTRAVENOUS; SUBCUTANEOUS EVERY 8 HOURS
Refills: 0 | Status: DISCONTINUED | OUTPATIENT
Start: 2020-03-03 | End: 2020-03-05

## 2020-03-03 RX ADMIN — ATORVASTATIN CALCIUM 20 MILLIGRAM(S): 80 TABLET, FILM COATED ORAL at 21:27

## 2020-03-03 RX ADMIN — Medication 100 MILLIEQUIVALENT(S): at 23:57

## 2020-03-03 RX ADMIN — Medication 100 MILLIEQUIVALENT(S): at 21:10

## 2020-03-03 RX ADMIN — LATANOPROST 1 DROP(S): 0.05 SOLUTION/ DROPS OPHTHALMIC; TOPICAL at 21:19

## 2020-03-03 RX ADMIN — HYDROMORPHONE HYDROCHLORIDE 0.5 MILLIGRAM(S): 2 INJECTION INTRAMUSCULAR; INTRAVENOUS; SUBCUTANEOUS at 18:15

## 2020-03-03 RX ADMIN — HYDROMORPHONE HYDROCHLORIDE 0.5 MILLIGRAM(S): 2 INJECTION INTRAMUSCULAR; INTRAVENOUS; SUBCUTANEOUS at 20:36

## 2020-03-03 RX ADMIN — SODIUM CHLORIDE 125 MILLILITER(S): 9 INJECTION, SOLUTION INTRAVENOUS at 23:54

## 2020-03-03 RX ADMIN — DORZOLAMIDE HYDROCHLORIDE TIMOLOL MALEATE 1 DROP(S): 20; 5 SOLUTION/ DROPS OPHTHALMIC at 23:12

## 2020-03-03 RX ADMIN — Medication 20 MILLIEQUIVALENT(S): at 20:57

## 2020-03-03 RX ADMIN — HYDROMORPHONE HYDROCHLORIDE 0.5 MILLIGRAM(S): 2 INJECTION INTRAMUSCULAR; INTRAVENOUS; SUBCUTANEOUS at 20:26

## 2020-03-03 RX ADMIN — SODIUM CHLORIDE 125 MILLILITER(S): 9 INJECTION, SOLUTION INTRAVENOUS at 23:33

## 2020-03-03 RX ADMIN — AMLODIPINE BESYLATE 10 MILLIGRAM(S): 2.5 TABLET ORAL at 21:19

## 2020-03-03 RX ADMIN — HEPARIN SODIUM 5000 UNIT(S): 5000 INJECTION INTRAVENOUS; SUBCUTANEOUS at 21:19

## 2020-03-03 RX ADMIN — BRIMONIDINE TARTRATE 1 DROP(S): 2 SOLUTION/ DROPS OPHTHALMIC at 21:19

## 2020-03-03 RX ADMIN — Medication 100 MILLIEQUIVALENT(S): at 22:40

## 2020-03-03 RX ADMIN — HYDROMORPHONE HYDROCHLORIDE 0.5 MILLIGRAM(S): 2 INJECTION INTRAMUSCULAR; INTRAVENOUS; SUBCUTANEOUS at 18:35

## 2020-03-03 NOTE — BRIEF OPERATIVE NOTE - NSICDXBRIEFPREOP_GEN_ALL_CORE_FT
PRE-OP DIAGNOSIS:  Renal calculus, right 03-Mar-2020 18:21:08  Ruthie Fu  Renal mass, right 03-Mar-2020 18:19:58  Ruthie Fu

## 2020-03-03 NOTE — PROGRESS NOTE ADULT - SUBJECTIVE AND OBJECTIVE BOX
Subjective  Patient is complaining of mild abdominal pain, no nausea, no vomiting.    Objective    Vital signs  T(F): , Max: 97.9 (03-03-20 @ 17:43)  HR: 96 (03-03-20 @ 20:30)  BP: 137/67 (03-03-20 @ 20:30)  SpO2: 100% (03-03-20 @ 20:30)  Wt(kg): --    Output     03-03 @ 07:01  -  03-03 @ 20:53  --------------------------------------------------------  IN: 250 mL / OUT: 700 mL / NET: -450 mL        Gen: No distress observed  Abd: soft, Mildly distended, appropriately tender, + right KATHERINE serosanguinous, dressing  c/di  : + blanco clear urine    Labs      03-03 @ 18:15    WBC --    / Hct --    / SCr 0.76       Urine Cx: ?  Blood Cx: ?    Imaging

## 2020-03-03 NOTE — BRIEF OPERATIVE NOTE - NSICDXBRIEFPROCEDURE_GEN_ALL_CORE_FT
PROCEDURES:  Cystoscopic insertion of right ureteral stent 03-Mar-2020 18:20:45  Ruthie Fu  Robot-assisted laparoscopic partial right nephrectomy 03-Mar-2020 18:19:47  Ruthie Fu

## 2020-03-03 NOTE — BRIEF OPERATIVE NOTE - NSICDXBRIEFPOSTOP_GEN_ALL_CORE_FT
POST-OP DIAGNOSIS:  Renal calculus, right 03-Mar-2020 18:21:16  Ruthie Fu  Renal mass, right 03-Mar-2020 18:20:06  Ruthie Fu

## 2020-03-04 DIAGNOSIS — N28.89 OTHER SPECIFIED DISORDERS OF KIDNEY AND URETER: ICD-10-CM

## 2020-03-04 DIAGNOSIS — N20.2 CALCULUS OF KIDNEY WITH CALCULUS OF URETER: ICD-10-CM

## 2020-03-04 DIAGNOSIS — I10 ESSENTIAL (PRIMARY) HYPERTENSION: ICD-10-CM

## 2020-03-04 DIAGNOSIS — H40.9 UNSPECIFIED GLAUCOMA: ICD-10-CM

## 2020-03-04 DIAGNOSIS — Z29.9 ENCOUNTER FOR PROPHYLACTIC MEASURES, UNSPECIFIED: ICD-10-CM

## 2020-03-04 DIAGNOSIS — E11.69 TYPE 2 DIABETES MELLITUS WITH OTHER SPECIFIED COMPLICATION: ICD-10-CM

## 2020-03-04 DIAGNOSIS — E78.00 PURE HYPERCHOLESTEROLEMIA, UNSPECIFIED: ICD-10-CM

## 2020-03-04 LAB
ANION GAP SERPL CALC-SCNC: 12 MMO/L — SIGNIFICANT CHANGE UP (ref 7–14)
ANION GAP SERPL CALC-SCNC: 12 MMO/L — SIGNIFICANT CHANGE UP (ref 7–14)
BASOPHILS # BLD AUTO: 0.04 K/UL — SIGNIFICANT CHANGE UP (ref 0–0.2)
BASOPHILS NFR BLD AUTO: 0.6 % — SIGNIFICANT CHANGE UP (ref 0–2)
BUN SERPL-MCNC: 8 MG/DL — SIGNIFICANT CHANGE UP (ref 7–23)
BUN SERPL-MCNC: 9 MG/DL — SIGNIFICANT CHANGE UP (ref 7–23)
CALCIUM SERPL-MCNC: 8.7 MG/DL — SIGNIFICANT CHANGE UP (ref 8.4–10.5)
CALCIUM SERPL-MCNC: 8.8 MG/DL — SIGNIFICANT CHANGE UP (ref 8.4–10.5)
CHLORIDE SERPL-SCNC: 106 MMOL/L — SIGNIFICANT CHANGE UP (ref 98–107)
CHLORIDE SERPL-SCNC: 110 MMOL/L — HIGH (ref 98–107)
CO2 SERPL-SCNC: 21 MMOL/L — LOW (ref 22–31)
CO2 SERPL-SCNC: 22 MMOL/L — SIGNIFICANT CHANGE UP (ref 22–31)
CREAT SERPL-MCNC: 0.74 MG/DL — SIGNIFICANT CHANGE UP (ref 0.5–1.3)
CREAT SERPL-MCNC: 0.75 MG/DL — SIGNIFICANT CHANGE UP (ref 0.5–1.3)
EOSINOPHIL # BLD AUTO: 0.1 K/UL — SIGNIFICANT CHANGE UP (ref 0–0.5)
EOSINOPHIL NFR BLD AUTO: 1.4 % — SIGNIFICANT CHANGE UP (ref 0–6)
GLUCOSE SERPL-MCNC: 144 MG/DL — HIGH (ref 70–99)
GLUCOSE SERPL-MCNC: 169 MG/DL — HIGH (ref 70–99)
HCT VFR BLD CALC: 33 % — LOW (ref 34.5–45)
HGB BLD-MCNC: 10.4 G/DL — LOW (ref 11.5–15.5)
IMM GRANULOCYTES NFR BLD AUTO: 0.1 % — SIGNIFICANT CHANGE UP (ref 0–1.5)
LYMPHOCYTES # BLD AUTO: 1.4 K/UL — SIGNIFICANT CHANGE UP (ref 1–3.3)
LYMPHOCYTES # BLD AUTO: 20 % — SIGNIFICANT CHANGE UP (ref 13–44)
MAGNESIUM SERPL-MCNC: 1.7 MG/DL — SIGNIFICANT CHANGE UP (ref 1.6–2.6)
MCHC RBC-ENTMCNC: 27.4 PG — SIGNIFICANT CHANGE UP (ref 27–34)
MCHC RBC-ENTMCNC: 31.5 % — LOW (ref 32–36)
MCV RBC AUTO: 87.1 FL — SIGNIFICANT CHANGE UP (ref 80–100)
MONOCYTES # BLD AUTO: 0.59 K/UL — SIGNIFICANT CHANGE UP (ref 0–0.9)
MONOCYTES NFR BLD AUTO: 8.4 % — SIGNIFICANT CHANGE UP (ref 2–14)
NEUTROPHILS # BLD AUTO: 4.87 K/UL — SIGNIFICANT CHANGE UP (ref 1.8–7.4)
NEUTROPHILS NFR BLD AUTO: 69.5 % — SIGNIFICANT CHANGE UP (ref 43–77)
NRBC # FLD: 0 K/UL — SIGNIFICANT CHANGE UP (ref 0–0)
PHOSPHATE SERPL-MCNC: 3 MG/DL — SIGNIFICANT CHANGE UP (ref 2.5–4.5)
PLATELET # BLD AUTO: 236 K/UL — SIGNIFICANT CHANGE UP (ref 150–400)
PMV BLD: 10.7 FL — SIGNIFICANT CHANGE UP (ref 7–13)
POTASSIUM SERPL-MCNC: 3.5 MMOL/L — SIGNIFICANT CHANGE UP (ref 3.5–5.3)
POTASSIUM SERPL-MCNC: 3.5 MMOL/L — SIGNIFICANT CHANGE UP (ref 3.5–5.3)
POTASSIUM SERPL-SCNC: 3.5 MMOL/L — SIGNIFICANT CHANGE UP (ref 3.5–5.3)
POTASSIUM SERPL-SCNC: 3.5 MMOL/L — SIGNIFICANT CHANGE UP (ref 3.5–5.3)
RBC # BLD: 3.79 M/UL — LOW (ref 3.8–5.2)
RBC # FLD: 13.7 % — SIGNIFICANT CHANGE UP (ref 10.3–14.5)
SODIUM SERPL-SCNC: 140 MMOL/L — SIGNIFICANT CHANGE UP (ref 135–145)
SODIUM SERPL-SCNC: 143 MMOL/L — SIGNIFICANT CHANGE UP (ref 135–145)
WBC # BLD: 7.01 K/UL — SIGNIFICANT CHANGE UP (ref 3.8–10.5)
WBC # FLD AUTO: 7.01 K/UL — SIGNIFICANT CHANGE UP (ref 3.8–10.5)

## 2020-03-04 PROCEDURE — 99223 1ST HOSP IP/OBS HIGH 75: CPT

## 2020-03-04 PROCEDURE — 99024 POSTOP FOLLOW-UP VISIT: CPT | Mod: AI

## 2020-03-04 RX ORDER — OXYCODONE HYDROCHLORIDE 5 MG/1
10 TABLET ORAL EVERY 4 HOURS
Refills: 0 | Status: DISCONTINUED | OUTPATIENT
Start: 2020-03-04 | End: 2020-03-05

## 2020-03-04 RX ORDER — OXYCODONE HYDROCHLORIDE 5 MG/1
5 TABLET ORAL EVERY 4 HOURS
Refills: 0 | Status: DISCONTINUED | OUTPATIENT
Start: 2020-03-04 | End: 2020-03-05

## 2020-03-04 RX ORDER — BRIMONIDINE TARTRATE 2 MG/MG
1 SOLUTION/ DROPS OPHTHALMIC
Refills: 0 | Status: DISCONTINUED | OUTPATIENT
Start: 2020-03-04 | End: 2020-03-05

## 2020-03-04 RX ORDER — POTASSIUM CHLORIDE 20 MEQ
20 PACKET (EA) ORAL ONCE
Refills: 0 | Status: COMPLETED | OUTPATIENT
Start: 2020-03-04 | End: 2020-03-04

## 2020-03-04 RX ORDER — POLYETHYLENE GLYCOL 3350 17 G/17G
17 POWDER, FOR SOLUTION ORAL DAILY
Refills: 0 | Status: DISCONTINUED | OUTPATIENT
Start: 2020-03-04 | End: 2020-03-05

## 2020-03-04 RX ORDER — SODIUM CHLORIDE 9 MG/ML
1000 INJECTION, SOLUTION INTRAVENOUS
Refills: 0 | Status: DISCONTINUED | OUTPATIENT
Start: 2020-03-04 | End: 2020-03-05

## 2020-03-04 RX ORDER — LATANOPROST 0.05 MG/ML
1 SOLUTION/ DROPS OPHTHALMIC; TOPICAL AT BEDTIME
Refills: 0 | Status: DISCONTINUED | OUTPATIENT
Start: 2020-03-04 | End: 2020-03-05

## 2020-03-04 RX ADMIN — Medication 2: at 08:04

## 2020-03-04 RX ADMIN — BRIMONIDINE TARTRATE 1 DROP(S): 2 SOLUTION/ DROPS OPHTHALMIC at 06:17

## 2020-03-04 RX ADMIN — LATANOPROST 1 DROP(S): 0.05 SOLUTION/ DROPS OPHTHALMIC; TOPICAL at 19:22

## 2020-03-04 RX ADMIN — Medication 20 MILLIEQUIVALENT(S): at 03:22

## 2020-03-04 RX ADMIN — DORZOLAMIDE HYDROCHLORIDE TIMOLOL MALEATE 1 DROP(S): 20; 5 SOLUTION/ DROPS OPHTHALMIC at 19:19

## 2020-03-04 RX ADMIN — Medication 10 MILLIGRAM(S): at 06:38

## 2020-03-04 RX ADMIN — OXYCODONE AND ACETAMINOPHEN 2 TABLET(S): 5; 325 TABLET ORAL at 09:48

## 2020-03-04 RX ADMIN — HEPARIN SODIUM 5000 UNIT(S): 5000 INJECTION INTRAVENOUS; SUBCUTANEOUS at 22:51

## 2020-03-04 RX ADMIN — Medication 2: at 17:12

## 2020-03-04 RX ADMIN — OXYCODONE HYDROCHLORIDE 10 MILLIGRAM(S): 5 TABLET ORAL at 20:37

## 2020-03-04 RX ADMIN — HEPARIN SODIUM 5000 UNIT(S): 5000 INJECTION INTRAVENOUS; SUBCUTANEOUS at 13:31

## 2020-03-04 RX ADMIN — ATORVASTATIN CALCIUM 20 MILLIGRAM(S): 80 TABLET, FILM COATED ORAL at 22:51

## 2020-03-04 RX ADMIN — OXYCODONE AND ACETAMINOPHEN 2 TABLET(S): 5; 325 TABLET ORAL at 09:18

## 2020-03-04 RX ADMIN — Medication 650 MILLIGRAM(S): at 16:45

## 2020-03-04 RX ADMIN — HEPARIN SODIUM 5000 UNIT(S): 5000 INJECTION INTRAVENOUS; SUBCUTANEOUS at 06:18

## 2020-03-04 RX ADMIN — Medication 20 MILLIEQUIVALENT(S): at 09:21

## 2020-03-04 RX ADMIN — BRIMONIDINE TARTRATE 1 DROP(S): 2 SOLUTION/ DROPS OPHTHALMIC at 19:19

## 2020-03-04 RX ADMIN — SODIUM CHLORIDE 75 MILLILITER(S): 9 INJECTION, SOLUTION INTRAVENOUS at 10:37

## 2020-03-04 RX ADMIN — DORZOLAMIDE HYDROCHLORIDE TIMOLOL MALEATE 1 DROP(S): 20; 5 SOLUTION/ DROPS OPHTHALMIC at 06:17

## 2020-03-04 RX ADMIN — Medication 20 MILLIEQUIVALENT(S): at 19:18

## 2020-03-04 RX ADMIN — OXYCODONE HYDROCHLORIDE 10 MILLIGRAM(S): 5 TABLET ORAL at 21:07

## 2020-03-04 RX ADMIN — POLYETHYLENE GLYCOL 3350 17 GRAM(S): 17 POWDER, FOR SOLUTION ORAL at 13:31

## 2020-03-04 RX ADMIN — Medication 10 MILLIGRAM(S): at 19:18

## 2020-03-04 RX ADMIN — SODIUM CHLORIDE 75 MILLILITER(S): 9 INJECTION, SOLUTION INTRAVENOUS at 13:31

## 2020-03-04 RX ADMIN — SODIUM CHLORIDE 125 MILLILITER(S): 9 INJECTION, SOLUTION INTRAVENOUS at 06:37

## 2020-03-04 RX ADMIN — Medication 650 MILLIGRAM(S): at 16:20

## 2020-03-04 RX ADMIN — AMLODIPINE BESYLATE 10 MILLIGRAM(S): 2.5 TABLET ORAL at 06:18

## 2020-03-04 RX ADMIN — SODIUM CHLORIDE 75 MILLILITER(S): 9 INJECTION, SOLUTION INTRAVENOUS at 22:52

## 2020-03-04 NOTE — CONSULT NOTE ADULT - PROBLEM SELECTOR RECOMMENDATION 2
s/p R ureteroscopy with lithotripsy and ureteral stent placement on 1/28/2020  Now s/p lap partial nephrectomy   Post-management as above

## 2020-03-04 NOTE — PROGRESS NOTE ADULT - ASSESSMENT
63 yo F underwent RAL right partial npehrectomy, ureteral stent placement    -AM labs reviewed  -replete Mg, K  -IVM  -continue CLD, monitor GI function  -d/c blanco monitor GI function  -DVT prophy, IS, OOB, ambulate 63 yo F underwent RAL right partial npehrectomy, ureteral stent placement    -AM labs reviewed  -IVM  -continue CLD, monitor GI function, AM dulcolax  -d/c blanco monitor GI function  -DVT prophy, IS, OOB, ambulate

## 2020-03-04 NOTE — PROGRESS NOTE ADULT - SUBJECTIVE AND OBJECTIVE BOX
Overnight events:  None    Subjective:  Pt offers no complaints, tolerating po, no flatus yet, not OOB yet    Objective:    Vital signs  T(C): , Max: 37.2 (03-04-20 @ 06:09)  HR: 85 (03-04-20 @ 06:09)  BP: 130/62 (03-04-20 @ 06:09)  SpO2: 95% (03-04-20 @ 06:09)  Wt(kg): --    Output   Francis: 2245  KATHERINE: 47.5  03-03 @ 07:01  -  03-04 @ 07:00  --------------------------------------------------------  IN: 2390 mL / OUT: 2842.5 mL / NET: -452.5 mL        Gen: NAD  Abd: gurwinderis c/d/i, soft, nontender, nondistended  : francis removed on [employer]    Labs                        10.4   7.01  )-----------( 236      ( 04 Mar 2020 05:45 )             33.0     04 Mar 2020 05:45    143    |  110    |  8      ----------------------------<  144    3.5     |  21     |  0.74     Ca    8.8        04 Mar 2020 05:45  Phos  3.0       04 Mar 2020 01:50  Mg     1.7       04 Mar 2020 01:50

## 2020-03-04 NOTE — PROGRESS NOTE ADULT - ATTENDING COMMENTS
Pt seen and examined. Agree with note as written above. Pt with minimal complaints    - Ambulate  - Encourage incentive spirometry  - Advance diet as tolerated  - DC planning

## 2020-03-04 NOTE — CONSULT NOTE ADULT - PROBLEM SELECTOR RECOMMENDATION 4
Hgb A1c 6.4   Continue insulin sliding scale   Hold Lantus for now, she is only drinking water at this time   Monitor fingersticks

## 2020-03-04 NOTE — CONSULT NOTE ADULT - PROBLEM SELECTOR RECOMMENDATION 9
s/p robotic assisted right laparoscopic partial nephrectomy and R ureteral stent on 3/3/2020  pain control   Awaiting GI function, bowel regimen   Incentive spirometry   Vega removed

## 2020-03-04 NOTE — CONSULT NOTE ADULT - PROBLEM SELECTOR PROBLEM 7
66 y/o F with CAD, HTN, hx of gastric ulcers and occult positive anemia, admitted for syncope workup; now being evaluated for decreasing h/h in the absence of melena or hematochezia 66 y/o F with CAD, HTN, hx of gastric ulcers and occult positive anemia, admitted for syncope workup; now being evaluated for decreasing h/h in the absence of melena or hematochezia 66 y/o F with CAD, HTN, hx of gastric ulcers and occult positive anemia, admitted for syncope workup; now being evaluated for decreasing h/h in the absence of melena or hematochezia Need for prophylactic measure

## 2020-03-04 NOTE — CONSULT NOTE ADULT - ASSESSMENT
63 y/o female with h/o HTN, DM, HLD, kidney stones, and recently diagnosed R renal mass, admitted for robotic assisted right laparoscopic partial nephrectomy and R ureteral stent placement.

## 2020-03-04 NOTE — CONSULT NOTE ADULT - SUBJECTIVE AND OBJECTIVE BOX
HPI:  63 y/o female with h/o HTN, DM, HLD, kidney stones, and recently diagnosed R renal mass, admitted for robotic assisted right laparoscopic partial nephrectomy and R ureteral stent placement.  Pt had severe right flank pain in October and went to the emergency room, had a CT scan that showed calculus of kidney and ureter as well as a mass to R kidney. Patient the underwent a R retrograde pyelogram and R ureteroscopy with lithotripsy and ureteral stent placement on 1/28/2020. Now she is s/p a robotic assisted right laparoscopic partial nephrectomy and R ureteral stent on 3/3/2020. Patient is currently c/o post-op pain in R abdomen and flank. The pain is constant, dull, non-radiating, 8 out of 10 on pain scale at its worst, worse w/ movement and palpation, improves w/ Oxycodone.     PAST MEDICAL & SURGICAL HISTORY:  Glaucoma  Calculus of kidney and ureter  Hypercholesterolemia  HTN (Hypertension)  Diabetes  History of lithotripsy: 1/2020  H/O eye surgery: Right  S/P Thyroidectomy      Review of Systems:   CONSTITUTIONAL: No fever, weight loss, or fatigue  EYES: No eye pain, visual disturbances, or discharge  ENMT:  No difficulty hearing, tinnitus, vertigo; No sinus or throat pain  NECK: No pain or stiffness  BREASTS: No pain, masses, or nipple discharge  RESPIRATORY: No cough, wheezing, chills or hemoptysis; No shortness of breath  CARDIOVASCULAR: No chest pain, palpitations, dizziness, or leg swelling  GASTROINTESTINAL: +abdominal/flank pain. No nausea, vomiting, or hematemesis; No diarrhea or constipation. No melena or hematochezia.  GENITOURINARY: No dysuria, frequency, hematuria, or incontinence  NEUROLOGICAL: No headaches, memory loss, loss of strength, numbness, or tremors  SKIN: No itching, burning, rashes, or lesions   LYMPH NODES: No enlarged glands  ENDOCRINE: No heat or cold intolerance; No hair loss  MUSCULOSKELETAL: No joint pain or swelling; No muscle, back, or extremity pain  PSYCHIATRIC: No depression, anxiety, mood swings, or difficulty sleeping  HEME/LYMPH: No easy bruising, or bleeding gums  ALLERY AND IMMUNOLOGIC: No hives or eczema    Allergies    No Known Allergies    Intolerances    Social History:      Never smoked   Occasionally drinks wine     FAMILY HISTORY:  FH: HTN (hypertension)      HOME MEDICATIONS:  metFORMIN 1000 mg oral tablet: Last Dose Taken:  , 1 tab(s) orally 2 times a day  amLODIPine 10 mg oral tablet: Last Dose Taken:  , 1 tab(s) orally once a day  insulin glargine 100 units/mL subcutaneous solution: Last Dose Taken:  , 10-20 unit(s) subcutaneous once a day (at bedtime)  Centrum Silver oral tablet: Last Dose Taken:  , 1 tab(s) orally once a day  Caltrate 600 mg oral tablet: Last Dose Taken:  , 2 tab(s) orally once a day  glimepiride: Last Dose Taken:  , 2  orally once a day  atorvastatin: Last Dose Taken:  , 20  orally once a day (at bedtime)  latanoprost 0.005% ophthalmic solution: Last Dose Taken:  , 1 drop(s) to each affected eye once a day (in the evening)  Vitamin C 500 mg oral tablet: Last Dose Taken:  , 1 tab(s) orally once a day  Cosopt 2.23%-0.68% ophthalmic solution: Last Dose Taken:  , 1 drop(s) to each affected eye 2 times a day  Alphagan P 0.15% ophthalmic solution: Last Dose Taken:  , 1 drop(s) to each affected eye 3 times a day    Vital Signs Last 24 Hrs  T(C): 36.8 (04 Mar 2020 09:50), Max: 37.2 (04 Mar 2020 06:09)  T(F): 98.2 (04 Mar 2020 09:50), Max: 99 (04 Mar 2020 06:09)  HR: 83 (04 Mar 2020 09:50) (81 - 98)  BP: 114/67 (04 Mar 2020 09:50) (96/48 - 150/77)  BP(mean): 82 (04 Mar 2020 03:00) (56 - 94)  RR: 18 (04 Mar 2020 09:50) (11 - 29)  SpO2: 100% (04 Mar 2020 09:50) (92% - 100%)  CAPILLARY BLOOD GLUCOSE      POCT Blood Glucose.: 126 mg/dL (04 Mar 2020 11:51)  POCT Blood Glucose.: 162 mg/dL (04 Mar 2020 07:58)  POCT Blood Glucose.: 166 mg/dL (03 Mar 2020 21:17)    I&O's Summary    03 Mar 2020 07:01  -  04 Mar 2020 07:00  --------------------------------------------------------  IN: 2390 mL / OUT: 2842.5 mL / NET: -452.5 mL    04 Mar 2020 07:01  -  04 Mar 2020 12:52  --------------------------------------------------------  IN: 275 mL / OUT: 10 mL / NET: 265 mL      PHYSICAL EXAM:  GENERAL: NAD, well-developed  EYES: EOMI, PERRLA, conjunctiva and sclera clear  NECK: Supple, No JVD  CHEST/LUNG: Clear to auscultation bilaterally; No wheeze  HEART: Regular rate and rhythm; No murmurs, rubs, or gallops  ABDOMEN: Soft, appropriately tender, Nondistended; Bowel sounds present  EXTREMITIES:  2+ Peripheral Pulses, No clubbing, cyanosis, or edema  PSYCH: AAOx3  NEUROLOGY: non-focal  SKIN: No rashes or lesions    LABS:                        10.4   7.01  )-----------( 236      ( 04 Mar 2020 05:45 )             33.0     03-04    143  |  110<H>  |  8   ----------------------------<  144<H>  3.5   |  21<L>  |  0.74    Ca    8.8      04 Mar 2020 05:45  Phos  3.0     03-04  Mg     1.7     03-04                RADIOLOGY & ADDITIONAL TESTS:    Imaging Personally Reviewed:  CT abdomen October 2019 reviewed:   Moderate right-sided hydronephrosis and mild perinephric stranding   secondary to a 10 mm calculus at the right ureteropelvic junction.   Additional nonobstructing right intrarenal calculi measuring up to 6 mm.    Indeterminate exophytic lesion arising from the lower pole the right   kidney lobulated contours measuring up to 4.1 cm which is relatively   isodense to the kidney.     Consultant(s) Notes Reviewed:      Care Discussed with Consultants/Other Providers: HPI:  63 y/o female with h/o HTN, DM, HLD, kidney stones, and recently diagnosed R renal mass, admitted for robotic assisted right laparoscopic partial nephrectomy and R ureteral stent placement.  Pt had severe right flank pain in October and went to the emergency room, had a CT scan that showed calculus of kidney and ureter as well as a mass to R kidney. Patient the underwent a R retrograde pyelogram and R ureteroscopy with lithotripsy and ureteral stent placement on 1/28/2020. Now she is s/p a robotic assisted right laparoscopic partial nephrectomy and R ureteral stent on 3/3/2020. Patient is currently c/o post-op pain in R abdomen and flank. The pain is constant, dull, non-radiating, 8 out of 10 on pain scale at its worst, worse w/ movement and palpation, improves w/ Oxycodone.     PAST MEDICAL & SURGICAL HISTORY:  Glaucoma  Calculus of kidney and ureter  Hypercholesterolemia  HTN (Hypertension)  Diabetes  History of lithotripsy: 1/2020  H/O eye surgery: Right  S/P Thyroidectomy      Review of Systems:   CONSTITUTIONAL: No fever, weight loss, or fatigue  EYES: No eye pain, visual disturbances, or discharge  ENMT:  No difficulty hearing, tinnitus, vertigo; No sinus or throat pain  NECK: No pain or stiffness  BREASTS: No pain, masses, or nipple discharge  RESPIRATORY: No cough, wheezing, chills or hemoptysis; No shortness of breath  CARDIOVASCULAR: No chest pain, palpitations, dizziness, or leg swelling  GASTROINTESTINAL: +abdominal/flank pain. No nausea, vomiting, or hematemesis; No diarrhea or constipation. No melena or hematochezia.  GENITOURINARY: No dysuria, frequency, hematuria, or incontinence  NEUROLOGICAL: No headaches, memory loss, loss of strength, numbness, or tremors  SKIN: No itching, burning, rashes, or lesions   LYMPH NODES: No enlarged glands  ENDOCRINE: No heat or cold intolerance; No hair loss  MUSCULOSKELETAL: No joint pain or swelling; No muscle, back, or extremity pain  PSYCHIATRIC: No depression, anxiety, mood swings, or difficulty sleeping  HEME/LYMPH: No easy bruising, or bleeding gums  ALLERY AND IMMUNOLOGIC: No hives or eczema    Allergies    No Known Allergies    Intolerances    Social History:      Never smoked   Occasionally drinks wine     FAMILY HISTORY:  FH: HTN (hypertension)      HOME MEDICATIONS:  metFORMIN 1000 mg oral tablet: Last Dose Taken:  , 1 tab(s) orally 2 times a day  amLODIPine 10 mg oral tablet: Last Dose Taken:  , 1 tab(s) orally once a day  insulin glargine 100 units/mL subcutaneous solution: Last Dose Taken:  , 10-20 unit(s) subcutaneous once a day (at bedtime)  Centrum Silver oral tablet: Last Dose Taken:  , 1 tab(s) orally once a day  Caltrate 600 mg oral tablet: Last Dose Taken:  , 2 tab(s) orally once a day  glimepiride: Last Dose Taken:  , 2  orally once a day  atorvastatin: Last Dose Taken:  , 20  orally once a day (at bedtime)  latanoprost 0.005% ophthalmic solution: Last Dose Taken:  , 1 drop(s) to each affected eye once a day (in the evening)  Vitamin C 500 mg oral tablet: Last Dose Taken:  , 1 tab(s) orally once a day  Cosopt 2.23%-0.68% ophthalmic solution: Last Dose Taken:  , 1 drop(s) to each affected eye 2 times a day  Alphagan P 0.15% ophthalmic solution: Last Dose Taken:  , 1 drop(s) to each affected eye 3 times a day    Vital Signs Last 24 Hrs  T(C): 36.8 (04 Mar 2020 09:50), Max: 37.2 (04 Mar 2020 06:09)  T(F): 98.2 (04 Mar 2020 09:50), Max: 99 (04 Mar 2020 06:09)  HR: 83 (04 Mar 2020 09:50) (81 - 98)  BP: 114/67 (04 Mar 2020 09:50) (96/48 - 150/77)  BP(mean): 82 (04 Mar 2020 03:00) (56 - 94)  RR: 18 (04 Mar 2020 09:50) (11 - 29)  SpO2: 100% (04 Mar 2020 09:50) (92% - 100%)  CAPILLARY BLOOD GLUCOSE      POCT Blood Glucose.: 126 mg/dL (04 Mar 2020 11:51)  POCT Blood Glucose.: 162 mg/dL (04 Mar 2020 07:58)  POCT Blood Glucose.: 166 mg/dL (03 Mar 2020 21:17)    I&O's Summary    03 Mar 2020 07:01  -  04 Mar 2020 07:00  --------------------------------------------------------  IN: 2390 mL / OUT: 2842.5 mL / NET: -452.5 mL    04 Mar 2020 07:01  -  04 Mar 2020 12:52  --------------------------------------------------------  IN: 275 mL / OUT: 10 mL / NET: 265 mL      PHYSICAL EXAM:  GENERAL: NAD, well-developed  EYES: EOMI, PERRLA, conjunctiva and sclera clear  NECK: Supple, No JVD  CHEST/LUNG: Clear to auscultation bilaterally; No wheeze  HEART: Regular rate and rhythm; No murmurs, rubs, or gallops  ABDOMEN: Soft, appropriately tender, Nondistended; Bowel sounds present  EXTREMITIES:  2+ Peripheral Pulses, No clubbing, cyanosis, or edema  PSYCH: AAOx3  NEUROLOGY: non-focal  SKIN: No rashes or lesions    LABS:                        10.4   7.01  )-----------( 236      ( 04 Mar 2020 05:45 )             33.0     03-04    143  |  110<H>  |  8   ----------------------------<  144<H>  3.5   |  21<L>  |  0.74    Ca    8.8      04 Mar 2020 05:45  Phos  3.0     03-04  Mg     1.7     03-04                RADIOLOGY & ADDITIONAL TESTS:    Imaging Personally Reviewed:  CT abdomen October 2019 reviewed:   Moderate right-sided hydronephrosis and mild perinephric stranding   secondary to a 10 mm calculus at the right ureteropelvic junction.   Additional nonobstructing right intrarenal calculi measuring up to 6 mm.    Indeterminate exophytic lesion arising from the lower pole the right   kidney lobulated contours measuring up to 4.1 cm which is relatively   isodense to the kidney.     Consultant(s) Notes Reviewed:      Care Discussed with Consultants/Other Providers: Spoke w/ urology PA, will hold [employer] for now HPI:  63 y/o female with h/o HTN, DM, HLD, kidney stones, and recently diagnosed R renal mass, admitted for robotic assisted right laparoscopic partial nephrectomy and R ureteral stent placement.  Pt had severe right flank pain in October and went to the emergency room, had a CT scan that showed calculus of kidney and ureter as well as a mass to R kidney. Patient the underwent a R retrograde pyelogram and R ureteroscopy with lithotripsy and ureteral stent placement on 1/28/2020. Now she is s/p a robotic assisted right laparoscopic partial nephrectomy and R ureteral stent on 3/3/2020. Patient is currently c/o post-op pain in R abdomen and flank. The pain is constant, dull, non-radiating, 8 out of 10 on pain scale at its worst, worse w/ movement and palpation, improves w/ Oxycodone.     PAST MEDICAL & SURGICAL HISTORY:  Glaucoma  Calculus of kidney and ureter  Hypercholesterolemia  HTN (Hypertension)  Diabetes  History of lithotripsy: 1/2020  H/O eye surgery: Right  S/P Thyroidectomy      Review of Systems:   CONSTITUTIONAL: No fever, weight loss, or fatigue  EYES: No eye pain, visual disturbances, or discharge  ENMT:  No difficulty hearing, tinnitus, vertigo; No sinus or throat pain  NECK: No pain or stiffness  BREASTS: No pain, masses, or nipple discharge  RESPIRATORY: No cough, wheezing, chills or hemoptysis; No shortness of breath  CARDIOVASCULAR: No chest pain, palpitations, dizziness, or leg swelling  GASTROINTESTINAL: +abdominal/flank pain. No nausea, vomiting, or hematemesis; No diarrhea or constipation. No melena or hematochezia.  GENITOURINARY: No dysuria, frequency, hematuria, or incontinence  NEUROLOGICAL: No headaches, memory loss, loss of strength, numbness, or tremors  SKIN: No itching, burning, rashes, or lesions   LYMPH NODES: No enlarged glands  ENDOCRINE: No heat or cold intolerance; No hair loss  MUSCULOSKELETAL: No joint pain or swelling; No muscle, back, or extremity pain  PSYCHIATRIC: No depression, anxiety, mood swings, or difficulty sleeping  HEME/LYMPH: No easy bruising, or bleeding gums  ALLERY AND IMMUNOLOGIC: No hives or eczema    Allergies    No Known Allergies    Intolerances    Social History:      Never smoked   Occasionally drinks wine     FAMILY HISTORY:  FH: HTN (hypertension)      HOME MEDICATIONS:  metFORMIN 1000 mg oral tablet: Last Dose Taken:  , 1 tab(s) orally 2 times a day  amLODIPine 10 mg oral tablet: Last Dose Taken:  , 1 tab(s) orally once a day  insulin glargine 100 units/mL subcutaneous solution: Last Dose Taken:  , 10-20 unit(s) subcutaneous once a day (at bedtime)  Centrum Silver oral tablet: Last Dose Taken:  , 1 tab(s) orally once a day  Caltrate 600 mg oral tablet: Last Dose Taken:  , 2 tab(s) orally once a day  glimepiride: Last Dose Taken:  , 2  orally once a day  atorvastatin: Last Dose Taken:  , 20  orally once a day (at bedtime)  latanoprost 0.005% ophthalmic solution: Last Dose Taken:  , 1 drop(s) to each affected eye once a day (in the evening)  Vitamin C 500 mg oral tablet: Last Dose Taken:  , 1 tab(s) orally once a day  Cosopt 2.23%-0.68% ophthalmic solution: Last Dose Taken:  , 1 drop(s) to each affected eye 2 times a day  Alphagan P 0.15% ophthalmic solution: Last Dose Taken:  , 1 drop(s) to each affected eye 3 times a day    Vital Signs Last 24 Hrs  T(C): 36.8 (04 Mar 2020 09:50), Max: 37.2 (04 Mar 2020 06:09)  T(F): 98.2 (04 Mar 2020 09:50), Max: 99 (04 Mar 2020 06:09)  HR: 83 (04 Mar 2020 09:50) (81 - 98)  BP: 114/67 (04 Mar 2020 09:50) (96/48 - 150/77)  BP(mean): 82 (04 Mar 2020 03:00) (56 - 94)  RR: 18 (04 Mar 2020 09:50) (11 - 29)  SpO2: 100% (04 Mar 2020 09:50) (92% - 100%)  CAPILLARY BLOOD GLUCOSE      POCT Blood Glucose.: 126 mg/dL (04 Mar 2020 11:51)  POCT Blood Glucose.: 162 mg/dL (04 Mar 2020 07:58)  POCT Blood Glucose.: 166 mg/dL (03 Mar 2020 21:17)    I&O's Summary    03 Mar 2020 07:01  -  04 Mar 2020 07:00  --------------------------------------------------------  IN: 2390 mL / OUT: 2842.5 mL / NET: -452.5 mL    04 Mar 2020 07:01  -  04 Mar 2020 12:52  --------------------------------------------------------  IN: 275 mL / OUT: 10 mL / NET: 265 mL      PHYSICAL EXAM:  GENERAL: NAD, well-developed  EYES: EOMI, PERRLA, conjunctiva and sclera clear  NECK: Supple, No JVD  CHEST/LUNG: Clear to auscultation bilaterally; No wheeze  HEART: Regular rate and rhythm; No murmurs, rubs, or gallops  ABDOMEN: Soft, appropriately tender, Nondistended; Bowel sounds present  EXTREMITIES:  2+ Peripheral Pulses, No clubbing, cyanosis, or edema  PSYCH: AAOx3  NEUROLOGY: non-focal  SKIN: No rashes or lesions    LABS:                        10.4   7.01  )-----------( 236      ( 04 Mar 2020 05:45 )             33.0     03-04    143  |  110<H>  |  8   ----------------------------<  144<H>  3.5   |  21<L>  |  0.74    Ca    8.8      04 Mar 2020 05:45  Phos  3.0     03-04  Mg     1.7     03-04                RADIOLOGY & ADDITIONAL TESTS:    Imaging Personally Reviewed:  CT abdomen October 2019 reviewed:   Moderate right-sided hydronephrosis and mild perinephric stranding   secondary to a 10 mm calculus at the right ureteropelvic junction.   Additional nonobstructing right intrarenal calculi measuring up to 6 mm.    Indeterminate exophytic lesion arising from the lower pole the right   kidney lobulated contours measuring up to 4.1 cm which is relatively   isodense to the kidney.     EKG tracing reviewed and interpreted: NSR     Consultant(s) Notes Reviewed:      Care Discussed with Consultants/Other Providers:

## 2020-03-05 ENCOUNTER — TRANSCRIPTION ENCOUNTER (OUTPATIENT)
Age: 65
End: 2020-03-05

## 2020-03-05 VITALS
RESPIRATION RATE: 17 BRPM | DIASTOLIC BLOOD PRESSURE: 84 MMHG | HEART RATE: 97 BPM | TEMPERATURE: 99 F | SYSTOLIC BLOOD PRESSURE: 123 MMHG | OXYGEN SATURATION: 100 %

## 2020-03-05 DIAGNOSIS — E87.6 HYPOKALEMIA: ICD-10-CM

## 2020-03-05 LAB
ANION GAP SERPL CALC-SCNC: 12 MMO/L — SIGNIFICANT CHANGE UP (ref 7–14)
BUN SERPL-MCNC: 5 MG/DL — LOW (ref 7–23)
CALCIUM SERPL-MCNC: 8.7 MG/DL — SIGNIFICANT CHANGE UP (ref 8.4–10.5)
CHLORIDE SERPL-SCNC: 108 MMOL/L — HIGH (ref 98–107)
CO2 SERPL-SCNC: 22 MMOL/L — SIGNIFICANT CHANGE UP (ref 22–31)
CREAT SERPL-MCNC: 0.79 MG/DL — SIGNIFICANT CHANGE UP (ref 0.5–1.3)
GLUCOSE SERPL-MCNC: 205 MG/DL — HIGH (ref 70–99)
MAGNESIUM SERPL-MCNC: 1.8 MG/DL — SIGNIFICANT CHANGE UP (ref 1.6–2.6)
POTASSIUM SERPL-MCNC: 3.1 MMOL/L — LOW (ref 3.5–5.3)
POTASSIUM SERPL-SCNC: 3.1 MMOL/L — LOW (ref 3.5–5.3)
SODIUM SERPL-SCNC: 142 MMOL/L — SIGNIFICANT CHANGE UP (ref 135–145)

## 2020-03-05 PROCEDURE — 99024 POSTOP FOLLOW-UP VISIT: CPT | Mod: AI

## 2020-03-05 PROCEDURE — 99232 SBSQ HOSP IP/OBS MODERATE 35: CPT

## 2020-03-05 RX ORDER — POLYETHYLENE GLYCOL 3350 17 G/17G
17 POWDER, FOR SOLUTION ORAL
Qty: 0 | Refills: 0 | DISCHARGE
Start: 2020-03-05

## 2020-03-05 RX ORDER — INSULIN GLARGINE 100 [IU]/ML
10 INJECTION, SOLUTION SUBCUTANEOUS AT BEDTIME
Refills: 0 | Status: DISCONTINUED | OUTPATIENT
Start: 2020-03-05 | End: 2020-03-05

## 2020-03-05 RX ORDER — SENNA PLUS 8.6 MG/1
2 TABLET ORAL
Qty: 0 | Refills: 0 | DISCHARGE
Start: 2020-03-05

## 2020-03-05 RX ORDER — ACETAMINOPHEN 500 MG
2 TABLET ORAL
Qty: 0 | Refills: 0 | DISCHARGE
Start: 2020-03-05

## 2020-03-05 RX ORDER — POTASSIUM CHLORIDE 20 MEQ
40 PACKET (EA) ORAL ONCE
Refills: 0 | Status: COMPLETED | OUTPATIENT
Start: 2020-03-05 | End: 2020-03-05

## 2020-03-05 RX ORDER — OXYCODONE HYDROCHLORIDE 5 MG/1
1 TABLET ORAL
Qty: 8 | Refills: 0
Start: 2020-03-05

## 2020-03-05 RX ADMIN — OXYCODONE HYDROCHLORIDE 10 MILLIGRAM(S): 5 TABLET ORAL at 06:55

## 2020-03-05 RX ADMIN — Medication 650 MILLIGRAM(S): at 12:38

## 2020-03-05 RX ADMIN — HEPARIN SODIUM 5000 UNIT(S): 5000 INJECTION INTRAVENOUS; SUBCUTANEOUS at 06:59

## 2020-03-05 RX ADMIN — Medication 4: at 07:58

## 2020-03-05 RX ADMIN — Medication 650 MILLIGRAM(S): at 13:30

## 2020-03-05 RX ADMIN — Medication 40 MILLIEQUIVALENT(S): at 09:48

## 2020-03-05 RX ADMIN — BRIMONIDINE TARTRATE 1 DROP(S): 2 SOLUTION/ DROPS OPHTHALMIC at 06:58

## 2020-03-05 RX ADMIN — Medication 20 MILLIEQUIVALENT(S): at 06:56

## 2020-03-05 RX ADMIN — Medication 4: at 12:08

## 2020-03-05 RX ADMIN — DORZOLAMIDE HYDROCHLORIDE TIMOLOL MALEATE 1 DROP(S): 20; 5 SOLUTION/ DROPS OPHTHALMIC at 06:59

## 2020-03-05 RX ADMIN — AMLODIPINE BESYLATE 10 MILLIGRAM(S): 2.5 TABLET ORAL at 06:56

## 2020-03-05 NOTE — DISCHARGE NOTE NURSING/CASE MANAGEMENT/SOCIAL WORK - NSDCFUADDAPPT_GEN_ALL_CORE_FT
Follow-up with your surgeon in the office as instructed for post-op check as well as with PMD for continuity of care.

## 2020-03-05 NOTE — PROGRESS NOTE ADULT - SUBJECTIVE AND OBJECTIVE BOX
PROGRESS NOTE:     Patient is a 64y old  Female who presents with a chief complaint of renal mass (04 Mar 2020 08:17)      SUBJECTIVE / OVERNIGHT EVENTS: No new complaints.     ADDITIONAL REVIEW OF SYSTEMS:    MEDICATIONS  (STANDING):  amLODIPine   Tablet 10 milliGRAM(s) Oral daily  atorvastatin 20 milliGRAM(s) Oral at bedtime  brimonidine 0.2% Ophthalmic Solution 1 Drop(s) Both EYES two times a day  dorzolamide 2%/timolol 0.5% Ophthalmic Solution 1 Drop(s) Both EYES two times a day  heparin  Injectable 5000 Unit(s) SubCutaneous every 8 hours  insulin lispro (HumaLOG) corrective regimen sliding scale   SubCutaneous three times a day before meals  insulin lispro (HumaLOG) corrective regimen sliding scale   SubCutaneous at bedtime  latanoprost 0.005% Ophthalmic Solution 1 Drop(s) Both EYES at bedtime  polyethylene glycol 3350 17 Gram(s) Oral daily  potassium chloride    Tablet ER 20 milliEquivalent(s) Oral two times a day    MEDICATIONS  (PRN):  acetaminophen   Tablet .. 650 milliGRAM(s) Oral every 6 hours PRN Mild Pain (1 - 3)  ondansetron Injectable 4 milliGRAM(s) IV Push once PRN Nausea and/or Vomiting  oxyCODONE    IR 5 milliGRAM(s) Oral every 4 hours PRN Moderate Pain (4 - 6)  oxyCODONE    IR 10 milliGRAM(s) Oral every 4 hours PRN Severe Pain (7 - 10)  senna 2 Tablet(s) Oral at bedtime PRN Constipation      CAPILLARY BLOOD GLUCOSE      POCT Blood Glucose.: 206 mg/dL (05 Mar 2020 11:51)  POCT Blood Glucose.: 216 mg/dL (05 Mar 2020 07:47)  POCT Blood Glucose.: 173 mg/dL (04 Mar 2020 22:11)  POCT Blood Glucose.: 185 mg/dL (04 Mar 2020 16:56)    I&O's Summary    04 Mar 2020 07:01  -  05 Mar 2020 07:00  --------------------------------------------------------  IN: 1675 mL / OUT: 2402 mL / NET: -727 mL    05 Mar 2020 07:01  -  05 Mar 2020 12:34  --------------------------------------------------------  IN: 0 mL / OUT: 220 mL / NET: -220 mL        PHYSICAL EXAM:  Vital Signs Last 24 Hrs  T(C): 36.6 (05 Mar 2020 10:48), Max: 37.1 (05 Mar 2020 00:05)  T(F): 97.8 (05 Mar 2020 10:48), Max: 98.8 (05 Mar 2020 00:05)  HR: 80 (05 Mar 2020 10:48) (78 - 87)  BP: 122/55 (05 Mar 2020 10:48) (110/63 - 127/69)  BP(mean): --  RR: 17 (05 Mar 2020 10:48) (16 - 17)  SpO2: 97% (05 Mar 2020 10:48) (96% - 100%)    CONSTITUTIONAL: NAD, well-developed  RESPIRATORY: Normal respiratory effort; lungs are clear to auscultation bilaterally  CARDIOVASCULAR: Regular rate and rhythm, normal S1 and S2, no murmur/rub/gallop; No lower extremity edema; Peripheral pulses are 2+ bilaterally  ABDOMEN: Appropriately tender to palpation, normoactive bowel sounds, no rebound/guarding; +KATHERINE   MUSCLOSKELETAL: no clubbing or cyanosis of digits; no joint swelling or tenderness to palpation  PSYCH: A+O to person, place, and time; affect appropriate    LABS:                        10.4   7.01  )-----------( 236      ( 04 Mar 2020 05:45 )             33.0     03-05    142  |  108<H>  |  5<L>  ----------------------------<  205<H>  3.1<L>   |  22  |  0.79    Ca    8.7      05 Mar 2020 06:51  Phos  3.0     03-04  Mg     1.8     03-05                  RADIOLOGY & ADDITIONAL TESTS:  Results Reviewed:   Imaging Personally Reviewed:  Electrocardiogram Personally Reviewed:    COORDINATION OF CARE:  Care Discussed with Consultants/Other Providers [Y/N]:  Prior or Outpatient Records Reviewed [Y/N]:

## 2020-03-05 NOTE — PROGRESS NOTE ADULT - PROBLEM SELECTOR PLAN 1
s/p robotic assisted right laparoscopic partial nephrectomy and R ureteral stent on 3/3/2020  pain control   Diet advanced and tolerated   Incentive spirometry   Management of KATHERINE drain as per

## 2020-03-05 NOTE — DISCHARGE NOTE PROVIDER - NSDCMRMEDTOKEN_GEN_ALL_CORE_FT
acetaminophen 325 mg oral tablet: 3 tablets every 6 hours as needed for mild to moderate pain  Alphagan P 0.15% ophthalmic solution: 1 drop(s) to each affected eye 3 times a day  amLODIPine 10 mg oral tablet: 1 tab(s) orally once a day  atorvastatin: 20  orally once a day (at bedtime)  Caltrate 600 mg oral tablet: 2 tab(s) orally once a day  Centrum Silver oral tablet: 1 tab(s) orally once a day  Cosopt 2.23%-0.68% ophthalmic solution: 1 drop(s) to each affected eye 2 times a day  glimepiride: 2  orally once a day  insulin glargine 100 units/mL subcutaneous solution: 25 unit(s) subcutaneous once a day (at bedtime)  10-20 as per pt  latanoprost 0.005% ophthalmic solution: 1 drop(s) to each affected eye once a day (in the evening)  metFORMIN 1000 mg oral tablet: 1 tab(s) orally 2 times a day  oxyCODONE 5 mg oral tablet: 1 tablet every 6 hours as needed for severe pain MDD:4  polyethylene glycol 3350 oral powder for reconstitution: 17 gram(s) orally once a day  potassium chloride 20 mEq oral tablet, extended release: 2 tab(s) orally once a day   senna oral tablet: 2 tab(s) orally once a day (at bedtime), As needed, Constipation  Vitamin C 500 mg oral tablet: 1 tab(s) orally once a day

## 2020-03-05 NOTE — DISCHARGE NOTE NURSING/CASE MANAGEMENT/SOCIAL WORK - NSDPDISTO_GEN_ALL_CORE
Pt. is afebrile and offers no complaints. In no acute distress. Abdominal scope sites: clean, dry and intact. Pt is ambulating, tolerating diet well, and voiding in adequate amounts./Home

## 2020-03-05 NOTE — DISCHARGE NOTE PROVIDER - CARE PROVIDER_API CALL
Emily Martinez (MD; MPH)  Urology  [address] [facility] [address] [place] [address]  [place] [address]  Phone: [phone]  Fax: [phone]  Follow Up Time:     Derek Correa (MD)  Medicine  [address] [place] [address]  Phone: [phone]  Fax: [phone]  Follow Up Time:

## 2020-03-05 NOTE — PROGRESS NOTE ADULT - ASSESSMENT
63 yo F underwent RAL right partial npehrectomy, ureteral stent placement 3/3/20    -f/u AM BMP  -IVL  -reg diet  -KATHERINE plan TBD  -DVT prophy, IS, OOB, ambulate

## 2020-03-05 NOTE — DISCHARGE NOTE PROVIDER - HOSPITAL COURSE
63 yo F underwent uncomplicated right robot assisted lap partial nephrectomy ureteral stent placement on 3/3/20.  Postoperative course uneventful, successful TOV on POD #1,  pain controlled, ambulating.  Return of GI function on POD #2, diet advanced without incident.  KATHERINE d/c and pt d/c on POD #2 to f/u with Dr. Martinez.    I-stop checked.

## 2020-03-05 NOTE — DISCHARGE NOTE NURSING/CASE MANAGEMENT/SOCIAL WORK - PATIENT PORTAL LINK FT
You can access the FollowMyHealth Patient Portal offered by [facility] by registering at the following website: [email or web address]. By joining [employer]’s FollowMyHealth portal, you will also be able to view your health information using other applications (apps) compatible with our system.

## 2020-03-05 NOTE — DISCHARGE NOTE PROVIDER - CARE PROVIDERS DIRECT ADDRESSES
[email or web address]

## 2020-03-05 NOTE — PROGRESS NOTE ADULT - PROBLEM SELECTOR PLAN 2
s/p R ureteroscopy with lithotripsy and ureteral stent placement on 1/28/2020  Now s/p lap partial nephrectomy   Post-op management as above.

## 2020-03-05 NOTE — DISCHARGE NOTE PROVIDER - NSDCCPTREATMENT_GEN_ALL_CORE_FT
PRINCIPAL PROCEDURE  Procedure: Robot-assisted laparoscopic partial right nephrectomy  Findings and Treatment:       SECONDARY PROCEDURE  Procedure: Cystoscopic insertion of right ureteral stent  Findings and Treatment:

## 2020-03-05 NOTE — PROGRESS NOTE ADULT - SUBJECTIVE AND OBJECTIVE BOX
Overnight events:  None    Subjective:  Pt offers no complaints, tolerating CLD, no N/V, + flatus, ambulating without problem, pain controlled    Objective:    Vital signs  T(C): , Max: 37.1 (03-05-20 @ 00:05)  HR: 80 (03-05-20 @ 06:48)  BP: 124/69 (03-05-20 @ 06:48)  SpO2: 100% (03-05-20 @ 06:48)  Wt(kg): --    Output   Void: 950  KATHERINE: 50  03-03 @ 07:01  -  03-04 @ 07:00  --------------------------------------------------------  IN: 2390 mL / OUT: 2842.5 mL / NET: -452.5 mL    03-04 @ 07:01  -  03-05 @ 06:53  --------------------------------------------------------  IN: 925 mL / OUT: 2152 mL / NET: -1227 mL        Gen: NAD  Abd: tamie c/d/i, soft, nontender, nondistended      Labs: pending                           Urine Cx:   Blood Cx:     Imaging no chest pain, no cough, and no shortness of breath.

## 2020-03-05 NOTE — PROGRESS NOTE ADULT - PROBLEM SELECTOR PLAN 4
Hgb A1c 6.4   Continue insulin sliding scale   May resume Lantus 10 units qhs   Monitor fingersticks.

## 2020-03-05 NOTE — DISCHARGE NOTE NURSING/CASE MANAGEMENT/SOCIAL WORK - NSDCPNINST_GEN_ALL_CORE
Make a follow up appointment with Dr. Martinez. Call MD if you develop a fever, or if there is redness, swelling, drainage or pain not relieved by pain medication. No heavy lifting, bending, or straining to move your bowels. Take over the counter stool softeners as needed to prevent constipation which may be caused by pain medication. Drink plenty of liquids. Your A1C= 6.4. Continue to follow a consistent carbohydrate diet and take your medications for diabetes.

## 2020-03-16 ENCOUNTER — APPOINTMENT (OUTPATIENT)
Age: 65
End: 2020-03-16
Payer: COMMERCIAL

## 2020-03-16 ENCOUNTER — APPOINTMENT (OUTPATIENT)
Age: 65
End: 2020-03-16

## 2020-03-16 VITALS — TEMPERATURE: 97.4 F | DIASTOLIC BLOOD PRESSURE: 79 MMHG | SYSTOLIC BLOOD PRESSURE: 127 MMHG | HEART RATE: 85 BPM

## 2020-03-16 PROCEDURE — 52310 CYSTOSCOPY AND TREATMENT: CPT

## 2020-03-17 LAB
ANION GAP SERPL CALC-SCNC: 14 MMOL/L
BUN SERPL-MCNC: 11 MG/DL
CALCIUM SERPL-MCNC: 10.4 MG/DL
CHLORIDE SERPL-SCNC: 104 MMOL/L
CO2 SERPL-SCNC: 23 MMOL/L
CREAT SERPL-MCNC: 0.83 MG/DL
GLUCOSE SERPL-MCNC: 56 MG/DL
POTASSIUM SERPL-SCNC: 3.4 MMOL/L
SODIUM SERPL-SCNC: 141 MMOL/L

## 2020-06-11 ENCOUNTER — APPOINTMENT (OUTPATIENT)
Dept: UROLOGY | Facility: CLINIC | Age: 65
End: 2020-06-11
Payer: COMMERCIAL

## 2020-06-11 VITALS — RESPIRATION RATE: 15 BRPM | SYSTOLIC BLOOD PRESSURE: 165 MMHG | DIASTOLIC BLOOD PRESSURE: 89 MMHG | TEMPERATURE: 97.9 F

## 2020-06-11 PROCEDURE — 99213 OFFICE O/P EST LOW 20 MIN: CPT

## 2020-06-14 NOTE — HISTORY OF PRESENT ILLNESS
[FreeTextEntry1] : 65 yo F presented to the ER with severe right flank pain\par Some nausea and vomiting\par no fever\par no gross hematuria, no dysuria\par CT showed nephrolithiasis and potential renal mass\par Discharged with antibiotics\par Pain still there but not as severe, last took ibuprofen last night\par Drinks 6 bottles of water, 1 cup of coffee daily\par No prior history of stones\par \par 12/2/19 Interval history: Pt feeling much better since last visit\par Some discomfort but no need for pain meds\par Here to review recent CT results\par \par 2/13/20 Interval history: Doing well since stent removal\par Did have some flank pain for about 2 days immediately after removal\par Currently feeling well\par \par 6/11/20 Interval history: Pt is s/p right partial nephrectomy on 3/3/20\par 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\par Doing well since procedure with no flank pain, gross hematuria\par Here today to review postop CT scan

## 2020-06-14 NOTE — ASSESSMENT
[FreeTextEntry1] : 65 yo F with history of nephrolithiasis, s/p right partial nephrectomy for renal mass. Final pathology was clear cell renal cell carcinoma Stage T1a\par \par - Reviewed CT which showed some nonosbtructing residual stone, postop changes, no evidence of recurrence\par - Reaffirmed behavioral modifications for her nephrolithiasis\par - FU in 6 months with renal US

## 2020-06-14 NOTE — PHYSICAL EXAM
[General Appearance - Well Developed] : well developed [Well Groomed] : well groomed [General Appearance - Well Nourished] : well nourished [General Appearance - In No Acute Distress] : no acute distress [Normal Appearance] : normal appearance [Abdomen Soft] : soft [Abdomen Tenderness] : non-tender [Costovertebral Angle Tenderness] : no ~M costovertebral angle tenderness [Urinary Bladder Findings] : the bladder was normal on palpation [Edema] : no peripheral edema [] : no respiratory distress [Respiration, Rhythm And Depth] : normal respiratory rhythm and effort [Oriented To Time, Place, And Person] : oriented to person, place, and time [Exaggerated Use Of Accessory Muscles For Inspiration] : no accessory muscle use [Affect] : the affect was normal [Not Anxious] : not anxious [Mood] : the mood was normal [Normal Station and Gait] : the gait and station were normal for the patient's age [No Palpable Adenopathy] : no palpable adenopathy [No Focal Deficits] : no focal deficits [FreeTextEntry1] : well healed incisions

## 2020-06-18 NOTE — CONSULT NOTE ADULT - PROBLEM/RECOMMENDATION-3
Lac kit in room. MD infiltrating and suturing. RN assisting by providing light (Rm 10).   DISPLAY PLAN FREE TEXT

## 2020-07-08 ENCOUNTER — APPOINTMENT (OUTPATIENT)
Dept: ORTHOPEDIC SURGERY | Facility: CLINIC | Age: 65
End: 2020-07-08

## 2020-10-19 ENCOUNTER — APPOINTMENT (OUTPATIENT)
Age: 65
End: 2020-10-19
Payer: COMMERCIAL

## 2020-10-19 ENCOUNTER — APPOINTMENT (OUTPATIENT)
Age: 65
End: 2020-10-19

## 2020-10-19 PROCEDURE — 76705 ECHO EXAM OF ABDOMEN: CPT

## 2020-11-10 NOTE — DISCHARGE NOTE PROVIDER - NSDCCPCAREPLAN_GEN_ALL_CORE_FT
November 17, 2020       Brandee Dominguez  [address] [place] [phone]      Dear Ms. Dominguez,    Please review the enclosed prep instructions for your procedure with Leif Tian MD.    [facility] will call you to schedule your COVID 19 test.     Procedure(s):  Colonoscopy   Date of Procedure: December 4, 2020  Time of Procedure: 830 AM  Arrival Time: 7:45 AM  Location:  [address], [address]  [place], [place] [address] [phone]  Please enter through the Physician's Office [place]    : Jenni [phone]    Please read these instructions very carefully at least 7 days prior to your procedure.     If there are questions about these instructions, please call the office at [phone]. Prior to the procedure, the procedure center will be calling to go over your health history and medications to be taken on the day of the procedure.     PATIENT CHECKLIST     · Blood Thinner(s) - Please contact the prescribing provider to get clearance to hold the following blood thinner(s):        Brilinta 3-5 days    · Aspirin does not need to be held, please continue taking it.  · Diabetics - Contact your primary physician or my office for instructions on your diabetic medications, including insulin. Check your blood sugars frequently the day you are on a clear liquid diet. In general, oral diabetic medications and regular insulin (NovoLog) are held on the morning of the procedure.    Additional Information:  · Please be on time, as arriving late could result in your procedure being delayed, canceled, or rescheduled.  · You are scheduled with Monitored Anesthesia Care (MAC) sedation and will need to have a legal adult (18 years or older) stay with you overnight the day of your procedure or your procedure will be canceled.    Colonoscopy Instructions - Suprep    7 Days prior to your procedure  · If possible, try to avoid non-steroidal  anti-inflammatory drugs (Ibuprofen, Advil, Motrin, Aleve, Naproxen etc.).   · Stop taking oral iron supplements.  Multi-vitamins with iron - OK to continue.  · Confirm you have an escort to take you home following the procedure. You cannot take a taxi cab, bus, or public transport home by yourself.   · Do not eat popcorn, seeds, nuts or whole kernel corn.     5 Days prior to your procedure  · Do not eat products with Forreston (a fat substitute found in [employer] and [place] Fat-Free chips).    1 Day before your procedure  · No solid food.  No alcohol.  · Clear liquids ALL DAY. If you can see through it, you can drink it. Examples are clear broth or consommé, fruit juices without pulp (no orange or tomato), sports drinks (Gatorade, Propel etc.), Jell-O (no fruit added), coffee or tea (sweeteners are ok, no milk or cream), soda or non-alcoholic carbonated drinks, popsicles, water, and clear hard candies. Avoid red and purple colors.   · It is important to try and stay hydrated during the day by drinking fluids. A solution such as Gatorade, or a similar sports drink, will help you to stay hydrated.  · At 5:00 PM, pour one (1) 6-ounce bottle of Suprep liquid into the mixing container. Add cool drinking water to the 16-ounce line on the container and mix. Drink all the liquid in the container. Then drink two (2) more 16-ounce containers of water over the next 1 hour.   · You can still continue to be on the clear liquid diet while prepping.      Day of the procedure  · No solid food. No alcohol.  · 6 hours prior to leaving the house, pour one (1) 6-ounce bottle of Suprep liquid into the mixing container. Add cool drinking water to the 16-ounce line on the container and mix. Drink all the liquid in the container. Then drink two (2) more 16-ounce containers of water over the next 1 hour.  · You can take your medications as instructed during phone call with procedure center staff, with a sip of water up to 4  hours prior to your scheduled procedure.   · You should not drink or take anything by mouth 4 hours prior to your procedure.  · Your stools should have a clear to see-through cloudy yellow appearance with no formed substance. If your stools are darker or have formed substance, please call the office and speak with a nurse who will get further instructions from your physician.    Prepping times and instructions can be altered depending on time of procedure.  Please call office and ask to speak to a nurse to discuss.    Special Instructions    Kidney disease - If you have any problems with kidney disease. You should not take this prep. Please call my office, and we will prescribe a safer alternative    Frequently Asked Questions    What is a colonoscopy?   A colonoscopy is a procedure where we can examine your large intestine for abnormalities. We use an endoscope which is a flexible tube as thick as your finger, that has a camera and light at the tip     How long is the colon?   The average length of the colon is 4 to 5 feet.     Why do I need to take the preparation and clear liquid diet?  The most important part in a successful exam is the preparation. It is important to clean your colon completely prior to the exam. If there is still remaining stool in the colon, it can prolong your procedure, and we may not be able to visualize the entire colon and lesions could be missed. If you have a substantial amount of stool remaining, the procedure may be terminated, and a repeat or alternative prep may be required.    We have you take the Suprep the evening prior and morning of the procedure since studies have shown that taking the prep in 2 separate doses help improve the cleansing of the colon.     Is there an alternative to the Suprep?  In the past, most preps were performed with Fleets Phospho-Soda. However recent studies have shown the risk of causing permanent kidney damage/failure. Due to the risk of permanent  kidney damage with the Fleet Phospho-Soda, most gastroenterologists have stopped using it.   There are alternatives to Suprep such as Nulytely, Prepopik and Moviprep.  If you would like the alternative, then please contact my office.      If people have problems taking the prep at home, there is an alternative where you can take the entire preparation in the hospital the day of the procedure.     Can I take all my medications?  Most medications can be continued without problems.  If you have questions, please call the office. Blood thinners and anti-diabetic medications may need modification prior as detailed above.      What can I expect the day of the procedure?  You will arrive at the facility where you are scheduled. We have you arrive early since you will need to fill out your information. An IV will be placed so we can give you medications. I will talk to you and answer any questions you may have before the procedure. Once inside the procedure room, sedation will be given to help you relax.    You will usually lie on your left side. I will then advance the scope to the end of your large intestine. During the procedure it is normal to feel some pressure, bloating or cramping. Careful examination will be performed throughout your colon. The entire procedure takes approximately 30 minutes, however this can be prolonged in complicated cases.    Once the procedure is complete, you will be brought to the recovery room until you are stable to leave. You should plan on being at the procedure site for 2 to 3 hours.     Will I be completely sedated for the procedure?  Choice of sedation - Moderate Intravenous OR Monitored Anesthesia Care, is based upon past medical history and current medication use. The doctor performing the procedure will make this decision. The goal is to keep you comfortable during the procedure.    If you have questions regarding sedation, please call the office to discuss further.  In most cases  patients receive conscious sedation, meaning that they will be in a “twilight sleep”. They will breathe on their own and will be able to follow commands. Since most of the discomfort is with inserting the scope, this is when the heaviest sedation is used. It is not unusual for people to be awake for part of the exam. The goal of the sedation is to keep you as comfortable as possible.     Patients that have a history of taking chronic medications such as pain medication, anti-anxiety medications, or alcohol use may build up a tolerance to the sedation. This may make it difficult to fully sedate you for the procedure.    What if something abnormal is found during the colonoscopy?  Most polyps can be removed at the time of the colonoscopy. Biopsies, tissue samples, can be obtained during the exam.     What are polyps?  Polyps are abnormal growth of colon tissue. A majority are benign or non-cancerous. All polyps that are removed are sent to the lab for analysis. Some polyps are precancerous, which is why it is important to remove them while they are small and non-cancerous. Polyps can range from a couple of millimeters to a couple of cm.     How are the polyps removed?  The polyps can be removed by biopsy instruments. Some polyps are removed with a wire snare and cautery. Cautery produces an electrical current to help burn and remove the polyps. You should not feel any pain with removal of the polyps. The polyps are then retrieved and sent to a lab to be analyzed.     What will happen after the procedure?  I will discuss the findings with you prior to discharge. You will receive a phone call or letter from the office within 10-14 business days with the results and recommendations. Your family history, your personal history, and the number/size and type of polyps found on most recent exam will determine when you may need a repeat colonoscopy.     Even if you feel alert after the procedure, your judgment and reflexes may  PRINCIPAL DISCHARGE DIAGNOSIS  Diagnosis: Renal mass, right  Assessment and Plan of Treatment: Drink plenty of fluids.  No heavy lifting (greater than 10 pounds) or straining for 4 to 6 weeks.  You may shower, just pat white strips dry, they will fall off in a few weeks. Change dressing at drain site daily or as needed until dry. Do not drive when taking pain medication.  Call Dr. Martinez's office  to schedule a follow up appointment.  Call the office if you have fever greater than 101, difficulty urinating, pain not relieved with pain medication, nausea/vomiting.  You may have intermittent blood tinged urine and slight flank pain when you urinate.  This is normal and due to the stent in your ureter.   If your urine becomes bright red or with clots, please call the office.  You have a ureteral stent to help with tissue healing and drainage of the kidney. The stent is temporary, and must be eventually removed or it may cause problems with infection and kidney damage if left in place greater than 3 months.        SECONDARY DISCHARGE DIAGNOSES  Diagnosis: Type 2 diabetes mellitus with other specified complication, with long-term current use of insulin  Assessment and Plan of Treatment: Continue current home medications and follow up with your primary care provider      Diagnosis: Glaucoma of both eyes, unspecified glaucoma type  Assessment and Plan of Treatment: Continue current home medications and follow up with your primary care provider      Diagnosis: Hypercholesterolemia  Assessment and Plan of Treatment: Continue current home medications and follow up with your primary care provider be impaired the rest of the day. You should not drive, work heavy machinery, or perform any other task that requires your full attention.    You may have bloating and cramping after the exam. This usually is improved with passing of gas.    Normally you may resume a regular diet after the procedure is completed. If you are on a blood thinner, this may be held if a large polyp is removed. You will be provided with clear instructions regarding when to resume your blood thinner medications.    Why do I need someone to accompany me to the exam?  Because you are given a sedative, you need someone you know to drive you home after the exam. If you are taking a bus, taxi or van service a responsible adult you know must accompany you. If this is a problem, the colonoscopy can be attempted without any sedation, or inpatient observation may be recommended.     What are the complications of the exam?  Colonoscopies are relatively safe, however complications can occur. Some, but not all, of the risk are discussed below.  Some patients may have an adverse reaction with the sedation or complications from heart and lung disease. There is also the risk of causing bleeding and perforation (poking a hole in the colon). If these complications do occur, they may need surgical treatment rarely. There is also a risk of missed lesions.     After the procedure, if you have any abdominal pain, fever, chills, or rectal bleeding it is important to notify me or seek immediate medical attention.  It is important to know that bleeding can occur even several days after the procedure.

## 2020-12-10 ENCOUNTER — APPOINTMENT (OUTPATIENT)
Age: 65
End: 2020-12-10

## 2021-02-18 ENCOUNTER — APPOINTMENT (OUTPATIENT)
Dept: UROLOGY | Facility: CLINIC | Age: 66
End: 2021-02-18
Payer: SELF-PAY

## 2021-02-18 PROCEDURE — 99072 ADDL SUPL MATRL&STAF TM PHE: CPT

## 2021-02-18 PROCEDURE — 99214 OFFICE O/P EST MOD 30 MIN: CPT

## 2021-02-21 RX ORDER — TAMSULOSIN HYDROCHLORIDE 0.4 MG/1
0.4 CAPSULE ORAL
Qty: 30 | Refills: 3 | Status: COMPLETED | COMMUNITY
Start: 2019-11-04 | End: 2021-02-21

## 2021-02-21 RX ORDER — CIPROFLOXACIN HYDROCHLORIDE 500 MG/1
500 TABLET, FILM COATED ORAL
Qty: 10 | Refills: 0 | Status: COMPLETED | COMMUNITY
Start: 2020-02-03 | End: 2021-02-21

## 2021-02-21 NOTE — ASSESSMENT
[FreeTextEntry1] : 66 yo F with hstory of RCC, nephrolithiasis presents with dysuria and pelvic pain\par \par - UA, culture\par - Reviewed potential etiologies for her symptoms \par - Start Bactrim. Rx transmitted. May need to tailor based on sensitivities\par - Reviewed last CT scan done in June, 2020 from [facility]. Should symptoms persist, will consider repeat CT sooner.

## 2021-02-21 NOTE — ASSESSMENT
[FreeTextEntry1] : 64 yo F with hstory of RCC, nephrolithiasis presents with dysuria and pelvic pain\par \par - UA, culture\par - Reviewed potential etiologies for her symptoms \par - Start Bactrim. Rx transmitted. May need to tailor based on sensitivities\par - Reviewed last CT scan done in June, 2020 from [facility]. Should symptoms persist, will consider repeat CT sooner.

## 2021-02-21 NOTE — PHYSICAL EXAM
[General Appearance - Well Developed] : well developed [General Appearance - Well Nourished] : well nourished [Normal Appearance] : normal appearance [General Appearance - In No Acute Distress] : no acute distress [Well Groomed] : well groomed [Abdomen Soft] : soft [Abdomen Tenderness] : non-tender [FreeTextEntry1] : well healed incisions [Costovertebral Angle Tenderness] : no ~M costovertebral angle tenderness [Urinary Bladder Findings] : the bladder was normal on palpation [] : no respiratory distress [Edema] : no peripheral edema [Respiration, Rhythm And Depth] : normal respiratory rhythm and effort [Exaggerated Use Of Accessory Muscles For Inspiration] : no accessory muscle use [Oriented To Time, Place, And Person] : oriented to person, place, and time [Affect] : the affect was normal [Not Anxious] : not anxious [Mood] : the mood was normal [Normal Station and Gait] : the gait and station were normal for the patient's age [No Focal Deficits] : no focal deficits [No Palpable Adenopathy] : no palpable adenopathy

## 2021-02-23 LAB
APPEARANCE: CLEAR
BACTERIA UR CULT: NORMAL
BACTERIA: NEGATIVE
BILIRUBIN URINE: NEGATIVE
BLOOD URINE: NEGATIVE
COLOR: NORMAL
GLUCOSE QUALITATIVE U: NEGATIVE
HYALINE CASTS: 2 /LPF
KETONES URINE: NEGATIVE
LEUKOCYTE ESTERASE URINE: NEGATIVE
MICROSCOPIC-UA: NORMAL
NITRITE URINE: NEGATIVE
PH URINE: 7
PROTEIN URINE: NORMAL
RED BLOOD CELLS URINE: 4 /HPF
SPECIFIC GRAVITY URINE: 1.01
SQUAMOUS EPITHELIAL CELLS: 1 /HPF
UROBILINOGEN URINE: NORMAL
WHITE BLOOD CELLS URINE: 14 /HPF

## 2021-07-20 ENCOUNTER — RESULT REVIEW (OUTPATIENT)
Age: 66
End: 2021-07-20

## 2021-07-25 NOTE — ED ADULT NURSE NOTE - PAIN RATING/NUMBER SCALE (0-10): ACTIVITY
0
I have reviewed and confirmed nurses' notes for patient's medications, allergies, medical history, and surgical history.

## 2021-07-29 ENCOUNTER — APPOINTMENT (OUTPATIENT)
Dept: UROLOGY | Facility: CLINIC | Age: 66
End: 2021-07-29
Payer: MEDICARE

## 2021-07-29 VITALS
WEIGHT: 164 LBS | SYSTOLIC BLOOD PRESSURE: 153 MMHG | RESPIRATION RATE: 14 BRPM | DIASTOLIC BLOOD PRESSURE: 92 MMHG | HEART RATE: 88 BPM | HEIGHT: 62 IN | TEMPERATURE: 97.2 F | BODY MASS INDEX: 30.18 KG/M2

## 2021-07-29 PROCEDURE — 99214 OFFICE O/P EST MOD 30 MIN: CPT

## 2021-07-29 RX ORDER — METFORMIN HYDROCHLORIDE 625 MG/1
TABLET ORAL
Refills: 0 | Status: ACTIVE | COMMUNITY

## 2021-08-01 NOTE — HISTORY OF PRESENT ILLNESS
[FreeTextEntry1] : 65 yo F presented to the ER with severe right flank pain\par Some nausea and vomiting\par no fever\par no gross hematuria, no dysuria\par CT showed nephrolithiasis and potential renal mass\par Discharged with antibiotics\par Pain still there but not as severe, last took ibuprofen last night\par Drinks 6 bottles of water, 1 cup of coffee daily\par No prior history of stones\par \par 12/2/19 Interval history: Pt feeling much better since last visit\par Some discomfort but no need for pain meds\par Here to review recent CT results\par \par 2/13/20 Interval history: Doing well since stent removal\par Did have some flank pain for about 2 days immediately after removal\par Currently feeling well\par \par 6/11/20 Interval history: Pt is s/p right partial nephrectomy on 3/3/20\par 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\par Doing well since procedure with no flank pain, gross hematuria\par Here today to review postop CT scan\par \par 2/18/21 Interval history: Was doing well with minimal issues until about 2 weeks ago\par Developed some dysuria, pelvic pain, low back pain\par normal bowel movements\par no fever, no gross hematuria\par \par 7/29/21 Interval history: Doing well since last visit\par No recurrence of dysuria, pelvic pain\par Had recent CT which showed no evidence of recurrent tumor, relatively stable nephrolithiasis

## 2021-08-01 NOTE — PHYSICAL EXAM
[General Appearance - Well Developed] : well developed [General Appearance - Well Nourished] : well nourished [Normal Appearance] : normal appearance [Well Groomed] : well groomed [General Appearance - In No Acute Distress] : no acute distress [Abdomen Soft] : soft [Abdomen Tenderness] : non-tender [Costovertebral Angle Tenderness] : no ~M costovertebral angle tenderness [FreeTextEntry1] : well healed incisions [Urinary Bladder Findings] : the bladder was normal on palpation [Edema] : no peripheral edema [] : no respiratory distress [Respiration, Rhythm And Depth] : normal respiratory rhythm and effort [Exaggerated Use Of Accessory Muscles For Inspiration] : no accessory muscle use [Affect] : the affect was normal [Oriented To Time, Place, And Person] : oriented to person, place, and time [Mood] : the mood was normal [Not Anxious] : not anxious [Normal Station and Gait] : the gait and station were normal for the patient's age [No Focal Deficits] : no focal deficits [No Palpable Adenopathy] : no palpable adenopathy

## 2021-08-01 NOTE — ASSESSMENT
[FreeTextEntry1] : 65 yo F with ihstory of RCC, nephrolithiasis\par \par - Reviewed recent CT from [facility]\par - Reaffirmed behavioral and diet modifications for stone control\par - FU in 6 months

## 2021-12-22 NOTE — DISCHARGE NOTE PROVIDER - NSDCCAREPROVSEEN_GEN_ALL_CORE_FT
Emily Martinez O-Z Plasty Text: The defect edges were debeveled with a #15 scalpel blade.  Given the location of the defect, shape of the defect and the proximity to free margins an O-Z plasty (double transposition flap) was deemed most appropriate.  Using a sterile surgical marker, the appropriate transposition flaps were drawn incorporating the defect and placing the expected incisions within the relaxed skin tension lines where possible.    The area thus outlined was incised deep to adipose tissue with a #15 scalpel blade.  The skin margins were undermined to an appropriate distance in all directions utilizing iris scissors.  Hemostasis was achieved with electrocautery.  The flaps were then transposed into place, one clockwise and the other counterclockwise, and anchored with interrupted buried subcutaneous sutures.

## 2022-02-03 ENCOUNTER — APPOINTMENT (OUTPATIENT)
Age: 67
End: 2022-02-03
Payer: MEDICARE

## 2022-02-03 PROCEDURE — 99213 OFFICE O/P EST LOW 20 MIN: CPT

## 2022-02-03 NOTE — HISTORY OF PRESENT ILLNESS
[FreeTextEntry1] : 63 yo F presented to the ER with severe right flank pain\par Some nausea and vomiting\par no fever\par no gross hematuria, no dysuria\par CT showed nephrolithiasis and potential renal mass\par Discharged with antibiotics\par Pain still there but not as severe, last took ibuprofen last night\par Drinks 6 bottles of water, 1 cup of coffee daily\par No prior history of stones\par \par 12/2/19 Interval history: Pt feeling much better since last visit\par Some discomfort but no need for pain meds\par Here to review recent CT results\par \par 2/13/20 Interval history: Doing well since stent removal\par Did have some flank pain for about 2 days immediately after removal\par Currently feeling well\par \par 6/11/20 Interval history: Pt is s/p right partial nephrectomy on 3/3/20\par 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\par Doing well since procedure with no flank pain, gross hematuria\par Here today to review postop CT scan\par \par 2/18/21 Interval history: Was doing well with minimal issues until about 2 weeks ago\par Developed some dysuria, pelvic pain, low back pain\par normal bowel movements\par no fever, no gross hematuria\par \par 7/29/21 Interval history: Doing well since last visit\par No recurrence of dysuria, pelvic pain\par Had recent CT which showed no evidence of recurrent tumor, relatively stable nephrolithiasis\par \par 4-6 bottles of water\par some dysuria yesterday\par no gross hematuria\par labwork from 1/24 - Cr was 0.79 hgb a1c 6.7

## 2022-02-07 LAB
APPEARANCE: CLEAR
BACTERIA UR CULT: NORMAL
BACTERIA: NEGATIVE
BILIRUBIN URINE: NEGATIVE
BLOOD URINE: NORMAL
COLOR: NORMAL
GLUCOSE QUALITATIVE U: NEGATIVE
HYALINE CASTS: 0 /LPF
KETONES URINE: NEGATIVE
LEUKOCYTE ESTERASE URINE: ABNORMAL
MICROSCOPIC-UA: NORMAL
NITRITE URINE: NEGATIVE
PH URINE: 6
PROTEIN URINE: NEGATIVE
RED BLOOD CELLS URINE: 1 /HPF
SPECIFIC GRAVITY URINE: 1.01
SQUAMOUS EPITHELIAL CELLS: 1 /HPF
UROBILINOGEN URINE: NORMAL
WHITE BLOOD CELLS URINE: 8 /HPF

## 2022-02-13 NOTE — PHYSICAL EXAM

## 2022-02-13 NOTE — HISTORY OF PRESENT ILLNESS
[FreeTextEntry1] : 63 yo F presented to the ER with severe right flank pain\par Some nausea and vomiting\par no fever\par no gross hematuria, no dysuria\par CT showed nephrolithiasis and potential renal mass\par Discharged with antibiotics\par Pain still there but not as severe, last took ibuprofen last night\par Drinks 6 bottles of water, 1 cup of coffee daily\par No prior history of stones\par \par 12/2/19 Interval history: Pt feeling much better since last visit\par Some discomfort but no need for pain meds\par Here to review recent CT results\par \par 2/13/20 Interval history: Doing well since stent removal\par Did have some flank pain for about 2 days immediately after removal\par Currently feeling well\par \par 6/11/20 Interval history: Pt is s/p right partial nephrectomy on 3/3/20\par 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\par Doing well since procedure with no flank pain, gross hematuria\par Here today to review postop CT scan\par \par 2/18/21 Interval history: Was doing well with minimal issues until about 2 weeks ago\par Developed some dysuria, pelvic pain, low back pain\par normal bowel movements\par no fever, no gross hematuria\par \par 7/29/21 Interval history: Doing well since last visit\par No recurrence of dysuria, pelvic pain\par Had recent CT which showed no evidence of recurrent tumor, relatively stable nephrolithiasis\par \par 2/3/22 Interval history: doing well since last visit\par Had some dysuria yesterday which resolved with hydration\par Drinks 4-6 bottles of water daily\par No gross hematuria\par labwork from 1/24 by PCP- Cr was 0.79 hgb a1c 6.7

## 2022-02-13 NOTE — ASSESSMENT
[FreeTextEntry1] : 65 yo F with history of RCC, nephrolithiasis\par \par - Reviewed labwork done by PCP and confirmed findings as stated above\par - Will plan for repeat CT in 6 months\par - Discussed possible etiologies for nephrolithiasis. Reviewed behavioral modifications including adequate hydration, cutting back on coffee, dark sodas, dark teas, low sodium diet, increasing citrate levels with lemon juice. \par - Discussed importance of seeking medical attention should intractable flank pain with nausea, vomiting or fever occur\par

## 2022-04-13 NOTE — H&P PST ADULT - NEGATIVE CARDIOVASCULAR SYMPTOMS
No
no chest pain/no palpitations/no paroxysmal nocturnal dyspnea/no peripheral edema/no orthopnea/no dyspnea on exertion/no claudication

## 2022-08-15 ENCOUNTER — APPOINTMENT (OUTPATIENT)
Age: 67
End: 2022-08-15

## 2022-08-15 VITALS
DIASTOLIC BLOOD PRESSURE: 74 MMHG | BODY MASS INDEX: 30.18 KG/M2 | TEMPERATURE: 97.2 F | HEIGHT: 62 IN | SYSTOLIC BLOOD PRESSURE: 122 MMHG | HEART RATE: 70 BPM | OXYGEN SATURATION: 98 % | WEIGHT: 164 LBS | RESPIRATION RATE: 15 BRPM

## 2022-08-15 DIAGNOSIS — Z87.898 PERSONAL HISTORY OF OTHER SPECIFIED CONDITIONS: ICD-10-CM

## 2022-08-15 DIAGNOSIS — N28.89 OTHER SPECIFIED DISORDERS OF KIDNEY AND URETER: ICD-10-CM

## 2022-08-15 PROCEDURE — 99213 OFFICE O/P EST LOW 20 MIN: CPT

## 2022-08-15 NOTE — ASSESSMENT
[FreeTextEntry1] : 66 yo F with history of RCC, nephrolithiasis\par \par - Reaffirmed behavioral modifications for nephrolithiasis control\par - FU in 6 months with repeat CT

## 2022-08-15 NOTE — HISTORY OF PRESENT ILLNESS
[FreeTextEntry1] : 65 yo F presented to the ER with severe right flank pain\par Some nausea and vomiting\par no fever\par no gross hematuria, no dysuria\par CT showed nephrolithiasis and potential renal mass\par Discharged with antibiotics\par Pain still there but not as severe, last took ibuprofen last night\par Drinks 6 bottles of water, 1 cup of coffee daily\par No prior history of stones\par \par 12/2/19 Interval history: Pt feeling much better since last visit\par Some discomfort but no need for pain meds\par Here to review recent CT results\par \par 2/13/20 Interval history: Doing well since stent removal\par Did have some flank pain for about 2 days immediately after removal\par Currently feeling well\par \par 6/11/20 Interval history: Pt is s/p right partial nephrectomy on 3/3/20\par 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\par Doing well since procedure with no flank pain, gross hematuria\par Here today to review postop CT scan\par \par 2/18/21 Interval history: Was doing well with minimal issues until about 2 weeks ago\par Developed some dysuria, pelvic pain, low back pain\par normal bowel movements\par no fever, no gross hematuria\par \par 7/29/21 Interval history: Doing well since last visit\par No recurrence of dysuria, pelvic pain\par Had recent CT which showed no evidence of recurrent tumor, relatively stable nephrolithiasis\par \par 2/3/22 Interval history: doing well since last visit\par Had some dysuria yesterday which resolved with hydration\par Drinks 4-6 bottles of water daily\par No gross hematuria\par labwork from 1/24 by PCP- Cr was 0.79 hgb a1c 6.7\par \par 8/15/22 Interval history: No issues since last visit\par No UTI\par No gross hematuria\par No changes in health

## 2023-02-16 ENCOUNTER — APPOINTMENT (OUTPATIENT)
Age: 68
End: 2023-02-16
Payer: MEDICARE

## 2023-02-16 ENCOUNTER — APPOINTMENT (OUTPATIENT)
Dept: UROLOGY | Facility: CLINIC | Age: 68
End: 2023-02-16

## 2023-02-16 VITALS
BODY MASS INDEX: 30.18 KG/M2 | DIASTOLIC BLOOD PRESSURE: 62 MMHG | HEART RATE: 83 BPM | WEIGHT: 164 LBS | HEIGHT: 62 IN | SYSTOLIC BLOOD PRESSURE: 114 MMHG | TEMPERATURE: 97.7 F | OXYGEN SATURATION: 98 %

## 2023-02-16 PROCEDURE — 99214 OFFICE O/P EST MOD 30 MIN: CPT

## 2023-02-26 NOTE — PHYSICAL EXAM

## 2023-02-26 NOTE — ASSESSMENT
[FreeTextEntry1] : 68 yo F with nephrolithiasis, history of RCC s/p partial nephrectomy\par \par - Reviewed imaging through [facility] portal and confirmed findings as stated above\par - Given increase in stone burden as well as what looks like stone in the renal pelvis, would recommend URS/HLL to prevent obstructive stones in the future\par - Pt wants time to think about options\par - Discussed possible etiologies for nephrolithiasis. Reviewed behavioral modifications including adequate hydration, cutting back on coffee, dark sodas, dark teas, low sodium diet, increasing citrate levels with lemon juice. \par - Discussed importance of seeking medical attention should intractable flank pain with nausea, vomiting or fever occur\par - FU in 1 month

## 2023-02-26 NOTE — HISTORY OF PRESENT ILLNESS
[FreeTextEntry1] : 65 yo F presented to the ER with severe right flank pain\par Some nausea and vomiting\par no fever\par no gross hematuria, no dysuria\par CT showed nephrolithiasis and potential renal mass\par Discharged with antibiotics\par Pain still there but not as severe, last took ibuprofen last night\par Drinks 6 bottles of water, 1 cup of coffee daily\par No prior history of stones\par \par 12/2/19 Interval history: Pt feeling much better since last visit\par Some discomfort but no need for pain meds\par Here to review recent CT results\par \par 2/13/20 Interval history: Doing well since stent removal\par Did have some flank pain for about 2 days immediately after removal\par Currently feeling well\par \par 6/11/20 Interval history: Pt is s/p right partial nephrectomy on 3/3/20\par 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\par Doing well since procedure with no flank pain, gross hematuria\par Here today to review postop CT scan\par \par 2/18/21 Interval history: Was doing well with minimal issues until about 2 weeks ago\par Developed some dysuria, pelvic pain, low back pain\par normal bowel movements\par no fever, no gross hematuria\par \par 7/29/21 Interval history: Doing well since last visit\par No recurrence of dysuria, pelvic pain\par Had recent CT which showed no evidence of recurrent tumor, relatively stable nephrolithiasis\par \par 2/3/22 Interval history: doing well since last visit\par Had some dysuria yesterday which resolved with hydration\par Drinks 4-6 bottles of water daily\par No gross hematuria\par labwork from 1/24 by PCP- Cr was 0.79 hgb a1c 6.7\par \par 8/15/22 Interval history: No issues since last visit\par No UTI\par No gross hematuria\par No changes in health\par \par 2/16/23 Interval history: Doing well since last visit with no issues\par Had recent on 1/31/23 which showed increase in stone burden in right kidney, no evidence of recurrent tumor

## 2023-03-06 ENCOUNTER — APPOINTMENT (OUTPATIENT)
Age: 68
End: 2023-03-06

## 2023-03-23 ENCOUNTER — APPOINTMENT (OUTPATIENT)
Dept: UROLOGY | Facility: CLINIC | Age: 68
End: 2023-03-23

## 2023-09-14 ENCOUNTER — APPOINTMENT (OUTPATIENT)
Dept: UROLOGY | Facility: CLINIC | Age: 68
End: 2023-09-14
Payer: COMMERCIAL

## 2023-09-14 VITALS
HEIGHT: 62 IN | SYSTOLIC BLOOD PRESSURE: 132 MMHG | BODY MASS INDEX: 30.18 KG/M2 | WEIGHT: 164 LBS | TEMPERATURE: 97.3 F | DIASTOLIC BLOOD PRESSURE: 71 MMHG | HEART RATE: 79 BPM | OXYGEN SATURATION: 98 %

## 2023-09-14 PROCEDURE — 99214 OFFICE O/P EST MOD 30 MIN: CPT

## 2023-10-04 ENCOUNTER — OUTPATIENT (OUTPATIENT)
Dept: OUTPATIENT SERVICES | Facility: HOSPITAL | Age: 68
LOS: 1 days | End: 2023-10-04
Payer: COMMERCIAL

## 2023-10-04 VITALS
HEIGHT: 62 IN | WEIGHT: 162.04 LBS | DIASTOLIC BLOOD PRESSURE: 77 MMHG | OXYGEN SATURATION: 100 % | HEART RATE: 67 BPM | RESPIRATION RATE: 17 BRPM | TEMPERATURE: 99 F | SYSTOLIC BLOOD PRESSURE: 135 MMHG

## 2023-10-04 DIAGNOSIS — Z98.890 OTHER SPECIFIED POSTPROCEDURAL STATES: Chronic | ICD-10-CM

## 2023-10-04 DIAGNOSIS — N20.0 CALCULUS OF KIDNEY: ICD-10-CM

## 2023-10-04 DIAGNOSIS — I10 ESSENTIAL (PRIMARY) HYPERTENSION: ICD-10-CM

## 2023-10-04 DIAGNOSIS — C64.1 MALIGNANT NEOPLASM OF RIGHT KIDNEY, EXCEPT RENAL PELVIS: ICD-10-CM

## 2023-10-04 DIAGNOSIS — E78.5 HYPERLIPIDEMIA, UNSPECIFIED: ICD-10-CM

## 2023-10-04 DIAGNOSIS — E11.9 TYPE 2 DIABETES MELLITUS WITHOUT COMPLICATIONS: ICD-10-CM

## 2023-10-04 DIAGNOSIS — Z90.5 ACQUIRED ABSENCE OF KIDNEY: Chronic | ICD-10-CM

## 2023-10-04 DIAGNOSIS — Z01.818 ENCOUNTER FOR OTHER PREPROCEDURAL EXAMINATION: ICD-10-CM

## 2023-10-04 LAB
A1C WITH ESTIMATED AVERAGE GLUCOSE RESULT: 6.8 % — HIGH (ref 4–5.6)
ALBUMIN SERPL ELPH-MCNC: 3.5 G/DL — SIGNIFICANT CHANGE UP (ref 3.5–5)
ALP SERPL-CCNC: 136 U/L — HIGH (ref 40–120)
ALT FLD-CCNC: 23 U/L DA — SIGNIFICANT CHANGE UP (ref 10–60)
ANION GAP SERPL CALC-SCNC: 6 MMOL/L — SIGNIFICANT CHANGE UP (ref 5–17)
APPEARANCE UR: ABNORMAL
APTT BLD: 35.2 SEC — SIGNIFICANT CHANGE UP (ref 24.5–35.6)
AST SERPL-CCNC: 10 U/L — SIGNIFICANT CHANGE UP (ref 10–40)
BACTERIA # UR AUTO: ABNORMAL /HPF
BILIRUB SERPL-MCNC: 0.8 MG/DL — SIGNIFICANT CHANGE UP (ref 0.2–1.2)
BILIRUB UR-MCNC: NEGATIVE — SIGNIFICANT CHANGE UP
BUN SERPL-MCNC: 9 MG/DL — SIGNIFICANT CHANGE UP (ref 7–18)
CALCIUM SERPL-MCNC: 8.8 MG/DL — SIGNIFICANT CHANGE UP (ref 8.4–10.5)
CHLORIDE SERPL-SCNC: 118 MMOL/L — HIGH (ref 96–108)
CO2 SERPL-SCNC: 22 MMOL/L — SIGNIFICANT CHANGE UP (ref 22–31)
COLOR SPEC: YELLOW — SIGNIFICANT CHANGE UP
COMMENT - URINE: SIGNIFICANT CHANGE UP
CREAT SERPL-MCNC: 0.98 MG/DL — SIGNIFICANT CHANGE UP (ref 0.5–1.3)
DIFF PNL FLD: ABNORMAL
EGFR: 62 ML/MIN/1.73M2 — SIGNIFICANT CHANGE UP
ESTIMATED AVERAGE GLUCOSE: 148 MG/DL — HIGH (ref 68–114)
GLUCOSE SERPL-MCNC: 87 MG/DL — SIGNIFICANT CHANGE UP (ref 70–99)
GLUCOSE UR QL: NEGATIVE MG/DL — SIGNIFICANT CHANGE UP
HCT VFR BLD CALC: 37.4 % — SIGNIFICANT CHANGE UP (ref 34.5–45)
HGB BLD-MCNC: 11.3 G/DL — LOW (ref 11.5–15.5)
INR BLD: 0.96 RATIO — SIGNIFICANT CHANGE UP (ref 0.85–1.18)
KETONES UR-MCNC: NEGATIVE MG/DL — SIGNIFICANT CHANGE UP
LEUKOCYTE ESTERASE UR-ACNC: ABNORMAL
MCHC RBC-ENTMCNC: 27.4 PG — SIGNIFICANT CHANGE UP (ref 27–34)
MCHC RBC-ENTMCNC: 30.2 GM/DL — LOW (ref 32–36)
MCV RBC AUTO: 90.8 FL — SIGNIFICANT CHANGE UP (ref 80–100)
NITRITE UR-MCNC: NEGATIVE — SIGNIFICANT CHANGE UP
NRBC # BLD: 0 /100 WBCS — SIGNIFICANT CHANGE UP (ref 0–0)
PH UR: 7.5 — SIGNIFICANT CHANGE UP (ref 5–8)
PLATELET # BLD AUTO: 308 K/UL — SIGNIFICANT CHANGE UP (ref 150–400)
POTASSIUM SERPL-MCNC: 4.1 MMOL/L — SIGNIFICANT CHANGE UP (ref 3.5–5.3)
POTASSIUM SERPL-SCNC: 4.1 MMOL/L — SIGNIFICANT CHANGE UP (ref 3.5–5.3)
PROT SERPL-MCNC: 7.5 G/DL — SIGNIFICANT CHANGE UP (ref 6–8.3)
PROT UR-MCNC: 100 MG/DL
PROTHROM AB SERPL-ACNC: 11 SEC — SIGNIFICANT CHANGE UP (ref 9.5–13)
RBC # BLD: 4.12 M/UL — SIGNIFICANT CHANGE UP (ref 3.8–5.2)
RBC # FLD: 13.1 % — SIGNIFICANT CHANGE UP (ref 10.3–14.5)
RBC CASTS # UR COMP ASSIST: 25 /HPF — HIGH (ref 0–4)
SODIUM SERPL-SCNC: 146 MMOL/L — HIGH (ref 135–145)
SP GR SPEC: 1.01 — SIGNIFICANT CHANGE UP (ref 1–1.03)
UROBILINOGEN FLD QL: 0.2 MG/DL — SIGNIFICANT CHANGE UP (ref 0.2–1)
WBC # BLD: 6.91 K/UL — SIGNIFICANT CHANGE UP (ref 3.8–10.5)
WBC # FLD AUTO: 6.91 K/UL — SIGNIFICANT CHANGE UP (ref 3.8–10.5)
WBC UR QL: 6 /HPF — HIGH (ref 0–5)

## 2023-10-04 PROCEDURE — 71046 X-RAY EXAM CHEST 2 VIEWS: CPT | Mod: 26

## 2023-10-04 RX ORDER — SODIUM CHLORIDE 9 MG/ML
3 INJECTION INTRAMUSCULAR; INTRAVENOUS; SUBCUTANEOUS EVERY 8 HOURS
Refills: 0 | Status: DISCONTINUED | OUTPATIENT
Start: 2023-10-10 | End: 2023-10-24

## 2023-10-04 RX ORDER — ATORVASTATIN CALCIUM 80 MG/1
20 TABLET, FILM COATED ORAL
Qty: 0 | Refills: 0 | DISCHARGE

## 2023-10-04 RX ORDER — ASCORBIC ACID 60 MG
1 TABLET,CHEWABLE ORAL
Qty: 0 | Refills: 0 | DISCHARGE

## 2023-10-04 NOTE — H&P PST ADULT - WHEN WAS YOUR LAST VACCINATION? YEAR
Hospitalist Progress Note


Assessment/Plan: 




















DIAGNOSES: 


#CHF with exacerbation, systolic


   -improved





#Aortic stenosis, with possibly progressive valvular disease


   -He is scheduled to see Dr. Kuhn or Dr. Sparks for discussion regarding 

TAVR vs Ballon Valvuloplasty on Oct 31





#Indeterminate troponin, with no chest pain, no indication for acute 

intervention





#FTT: PT/OT eval pending





#Fatigue, multifactorial





#Weakness and deconditioning, multifactorial, improving





#chronic AC with elevated INR, Pharm to dose Warfarin





#Hx of AFib, On Digoxin. Holding low dose BB due to borderline low BP





#Hx of Factor V Leiden with PE's





#Anemia, slight, no indication for transfusion





#elevated TB with cholelithiasis, no RUQ abd pain, neg haney's: Conservative 

mgmt





#Generalized abd discomfort and hx of GERD, improved with Protonix BID, will 

cont at current dose





#Diarrhea, non C-diff,





#SPCMN: low albumin, prealbumin, low oral intake. Nutrition/dietician following

, oral intake has increased





PLANS:


At this point he is medically stable for discharge from a cardiac point of 

view.  However he is having ongoing difficulties with transfers and ambulation.

  He has his appointment tomorrow morning for consideration of TAVR.  The 

current plan would be to have him go to his appointment with Dr. Kuhn tomorrow 

morning upon discharge from here and proceed from that appointment to skilled 

nursing facility rehab where he will work on conditioning imbalance and 

transfer skills while waiting potentially has TAVR or other procedure





SUBJECTIVE:


The patient feels well, improved in terms of dyspnea and other cardiac symptoms

, however he has been having ongoing problems with transfers balance and 

mobility and they persist.





OBJECTIVE


Vitals reviewed:  Remains mildly hypotensive with some intermittent tachypnea, 

no fever


Cardiac Monitor, my review:  Sinus





Exam:


alert oriented 


skin warm dry color ok


resps not labored


lungs clear BSs


heart regular, obvious murmur





iv site ok





Stress echocardiogram:  Aortic valve gradient increased from 60-79 with exercise

, resting ejection fraction 45%

















Objective: 


 Vital Signs











Temp Pulse Resp BP Pulse Ox


 


 36.4 C   87   24 H  98/58 L  94 


 


 10/30/17 12:00  10/30/17 12:00  10/30/17 12:00  10/30/17 12:00  10/30/17 12:00








 Laboratory Results





 10/30/17 03:56 





 10/30/17 03:56 





 











 10/29/17 10/30/17 10/31/17





 06:59 06:59 06:59


 


Intake Total 550 590 352.3


 


Output Total 850 1600 800


 


Balance -300 -1010 -447.7








 











PT  24.4 SEC (12.0-15.0)  H  10/30/17  03:56    


 


INR  2.18  (0.83-1.16)  H  10/30/17  03:56    














ICD10 Worksheet


Patient Problems: 


 Problems











Problem Status Onset


 


Elevated bilirubin Acute  


 


Elevated troponin Acute  


 


Coronary artery bypass grafting Active  


 


Hematuria syndrome Active  


 


Nausea and vomiting Active  


 


Non-healing surgical wound Active  


 


Acute combined systolic and diastolic CHF, NYHA class 3 Acute  


 


Chest pain Acute  


 


Chronic Disease Management/Transitional Care Program Acute  


 


Coronary arteriosclerosis Acute  


 


Gastrointestinal bleeding, lower Acute  


 


S/P CABG x 3 Acute
Hospitalist Progress Note


Assessment/Plan: 





80 yo male with MMP, CHF, AS, admitted for FTT, fatigue, exertional dyspnea x 4 

weeks. A few months ago Lasix was decreased from 40mg to 20mg daily and he has 

noticed increased leg swelling above knee bilaterally since. Has had very low 

energy levels. Is on chronic O2 at 2L but no changes to this. No CP. No SOB, No 

orthopnea. feels slightly better since getting Lasix this morning. 


Very low oral intake over the last few week, + weight loss.


Infectious w/u is negative


See's Dr. Rushing for CARDs





#CHF with exacerbation, systolic


#Aortic stenosis, with possibly progressive valvular disease


#Indeterminate troponin, with no chest pain


#FTT


#Likely SPCMN


#Fatigue


#Weakness and deconditioning


#chronic AC with elevated INR


#Hx of AFib


#Hx of Factor V Leiden with PE's


#Anemia


#elevated TB with cholelithiasis, no RUQ abd pain, neg haney's: Conservative 

mgmt


#Generalized abd discomfort and hx of GERD


#Diarrhea, non C-diff





Plan:


-Already received Lasix this morning


-Cards to see, likely need to increase home Lasix back to 40mg


-TTE pending, may have valvular disease progression


-At this current state, he would not be a good candidate for surgical management


-Nutrition consult


-PT/OT





Full Code








Subjective: Fees fatigued. NO CP or SOB.


Objective: 


 Vital Signs











Temp Pulse Resp BP Pulse Ox


 


 36.3 C   66   24 H  99/51 L  94 


 


 10/26/17 10:51  10/26/17 10:51  10/26/17 10:51  10/26/17 10:51  10/26/17 10:51








 Microbiology











 10/26/17 08:20 Gastrointestinal Tract Panel (PCR) - Final





 Stool    No Organism Detected








 Laboratory Results





 10/26/17 06:04 





 10/26/17 06:04 





 











 10/25/17 10/26/17 10/27/17





 05:59 05:59 05:59


 


Intake Total  150 


 


Balance  150 








 











PT  39.2 SEC (12.0-15.0)  H  10/26/17  06:04    


 


INR  3.94  (0.83-1.16)  H  10/26/17  06:04    














- Time Spent With Patient


Time Spent with Patient: greater than 35 minutes


Time Spent with Patient: Greater than 35 minutes spent on this patients care, 

greater than 50% of time spent counseling, educating, and coordinating care 

regarding the above mentioned plan.





- Physical Exam


Constitutional: chronically ill appearing


Eyes: PERRL, EOMI


Ears, Nose, Mouth, Throat: moist mucous membranes


Cardiovascular: regular rate and rhythym, edema


Respiratory: rhonchi


Gastrointestinal: normoactive bowel sounds


Neurologic: AAOx3


Psychiatric: interacting appropriately, not anxious, not encephalopathic





ICD10 Worksheet


Patient Problems: 


 Problems











Problem Status Onset


 


Elevated bilirubin Acute  


 


Elevated troponin Acute  


 


Coronary artery bypass grafting Active  


 


Hematuria syndrome Active  


 


Nausea and vomiting Active  


 


Non-healing surgical wound Active  


 


Acute combined systolic and diastolic CHF, NYHA class 3 Acute  


 


Chest pain Acute  


 


Chronic Disease Management/Transitional Care Program Acute  


 


Coronary arteriosclerosis Acute  


 


Gastrointestinal bleeding, lower Acute  


 


S/P CABG x 3 Acute
Hospitalist Progress Note


Assessment/Plan: 





82 yo male with MMP, CHF, AS, AR, admitted for FTT, fatigue, exertional dyspnea 

x 4 weeks, weight loss, low oral intake. A few months ago Lasix was decreased 

from 40mg to 20mg daily and he has noticed increased leg swelling above knee 

bilaterally since. Has had very low energy levels. Is on chronic O2 at 2L but 

no changes to this.


Infectious w/u is negative


See's Dr. Rushing for CARDs


Tolerating Lasix 40mg IV BID





#CHF with exacerbation, systolic


-improving


-Lasix IV BID per Cards


#Aortic stenosis, with possibly progressive valvular disease


-He is scheduled to see Dr. Kuhn or Dr. Sparks for discussion regarding TAVR 

vs Ballon Valvuloplasty on Oct 31


-Prior to any procedural intervention, would optimize his nutrition as well as 

his deconditioning


#Indeterminate troponin, with no chest pain, no indication for acute 

intervention


#FTT: PT/OT eval pending


#SPCMN: low albumin, prealbumin, low oral intake. Nutrition/dietician following

, oral intake has increased


#Fatigue, multifactorial


#Weakness and deconditioning, multifactorial, improving


#chronic AC with elevated INR, Pharm to dose Warfarin


#Hx of AFib, On Digoxin. Holding low dose BB due to borderline low BP


#Hx of Factor V Leiden with PE's


#Anemia, slight, no indication for transfusion


#elevated TB with cholelithiasis, no RUQ abd pain, neg haney's: Conservative 

mgmt


#Generalized abd discomfort and hx of GERD, improved with Protonix BID, will 

cont at current dose


#Diarrhea, non C-diff, much improved, Resolved





Plan:


-Diuretics per cards


-PT/OT


-Nutrition/Dietician


-Will likely need Rehab


-no changes today








Full Code








Subjective: Feeling stronger. Can walk in [place] for longer distances. Still with 

edema. No CP or SOB.


Objective: 


 Vital Signs











Temp Pulse Resp BP Pulse Ox


 


 36.6 C   81   16   97/57 L  98 


 


 10/29/17 12:00  10/29/17 12:00  10/29/17 12:00  10/29/17 12:00  10/29/17 12:00








 Laboratory Results





 10/27/17 04:10 





 10/29/17 04:30 





 











 10/28/17 10/29/17 10/30/17





 05:59 05:59 05:59


 


Intake Total 1600 550 


 


Output Total 420 850 525


 


Balance 1180 -300 -525








 











PT  20.3 SEC (12.0-15.0)  H  10/29/17  04:30    


 


INR  1.73  (0.83-1.16)  H  10/29/17  04:30    














- Physical Exam


Constitutional: chronically ill appearing


Eyes: PERRL, EOMI


Ears, Nose, Mouth, Throat: moist mucous membranes, hearing normal


Cardiovascular: regular rate and rhythym, edema


Respiratory: no respiratory distress


Gastrointestinal: normoactive bowel sounds


Musculoskeletal: generalized weakness


Neurologic: AAOx3


Psychiatric: interacting appropriately, not anxious, not encephalopathic





ICD10 Worksheet


Patient Problems: 


 Problems











Problem Status Onset


 


Elevated bilirubin Acute  


 


Elevated troponin Acute  


 


Coronary artery bypass grafting Active  


 


Hematuria syndrome Active  


 


Nausea and vomiting Active  


 


Non-healing surgical wound Active  


 


Acute combined systolic and diastolic CHF, NYHA class 3 Acute  


 


Chest pain Acute  


 


Chronic Disease Management/Transitional Care Program Acute  


 


Coronary arteriosclerosis Acute  


 


Gastrointestinal bleeding, lower Acute  


 


S/P CABG x 3 Acute
Hospitalist Progress Note


Assessment/Plan: 


Medically complex





80 yo male with MMP, CHF, AS, AR, admitted for FTT, fatigue, exertional dyspnea 

x 4 weeks, weight loss, low oral intake. A few months ago Lasix was decreased 

from 40mg to 20mg daily and he has noticed increased leg swelling above knee 

bilaterally since. Has had very low energy levels. Is on chronic O2 at 2L but 

no changes to this.


Infectious w/u is negative


See's Dr. Rushing for CARDs





#CHF with exacerbation, systolic


-improving


-Lasix IV BID per Cards


#Aortic stenosis, with possibly progressive valvular disease


-He is scheduled to see Dr. Kuhn or Dr. Sparks for discussion regarding TAVR 

vs Ballon Valvuloplasty on Oct 31


-Prior to any procedural intervention, would optimize his nutrition as well as 

his deconditioning


#Indeterminate troponin, with no chest pain, no indication for acute 

intervention


#FTT: PT/OT eval pending


#SPCMN: low albumin, prealbumin, low oral intake. Nutrition/dietician to consult


#Fatigue, multifactorial


#Weakness and deconditioning, multifactorial


#chronic AC with elevated INR, Pharm to dose INR


#Hx of AFib, On Digoxin. Holding low dose BB due to borderline low BP


#Hx of Factor V Leiden with PE's


#Anemia, slight, no indication for transfusion


#elevated TB with cholelithiasis, no RUQ abd pain, neg haney's: Conservative 

mgmt


#Generalized abd discomfort and hx of GERD, improved with Protonix BID, will 

cont at current dose


#Diarrhea, non C-diff, much improved, Resolved





Plan:


-Diuretics per cards


-PT/OT


-Nutrition/Dietician


-Will likely need Rehab


-Cards following








Full Code








Subjective: Feels better. walking in [place]. Diuresis continues.


Objective: 


 Vital Signs











Temp Pulse Resp BP Pulse Ox


 


 36.3 C   75   13   93/47 L  100 


 


 10/28/17 08:00  10/28/17 11:05  10/28/17 08:00  10/28/17 08:00  10/28/17 08:00








 Laboratory Results





 10/27/17 04:10 





 











 10/27/17 10/28/17 10/29/17





 05:59 05:59 05:59


 


Intake Total 1250 1600 


 


Output Total  420 


 


Balance 1250 1180 








 











PT  22.0 SEC (12.0-15.0)  H D 10/28/17  03:56    


 


INR  1.91  (0.83-1.16)  H  10/28/17  03:56    














- Physical Exam


Constitutional: no apparent distress, chronically ill appearing


Eyes: PERRL, EOMI


Ears, Nose, Mouth, Throat: moist mucous membranes


Cardiovascular: regular rate and rhythym, edema


Respiratory: no respiratory distress, inspiratory crackles, No no rales or 

rhonchi


Skin: warm


Musculoskeletal: generalized weakness


Neurologic: AAOx3


Psychiatric: interacting appropriately, not anxious, not encephalopathic





ICD10 Worksheet


Patient Problems: 


 Problems











Problem Status Onset


 


Elevated bilirubin Acute  


 


Elevated troponin Acute  


 


Coronary artery bypass grafting Active  


 


Hematuria syndrome Active  


 


Nausea and vomiting Active  


 


Non-healing surgical wound Active  


 


Acute combined systolic and diastolic CHF, NYHA class 3 Acute  


 


Chest pain Acute  


 


Chronic Disease Management/Transitional Care Program Acute  


 


Coronary arteriosclerosis Acute  


 


Gastrointestinal bleeding, lower Acute  


 


S/P CABG x 3 Acute
Hospitalist Progress Note


Assessment/Plan: 


Medically complex





80 yo male with MMP, CHF, AS, AR, admitted for FTT, fatigue, exertional dyspnea 

x 4 weeks, weight loss, low oral intake. A few months ago Lasix was decreased 

from 40mg to 20mg daily and he has noticed increased leg swelling above knee 

bilaterally since. Has had very low energy levels. Is on chronic O2 at 2L but 

no changes to this. No CP. No SOB, No orthopnea.


Infectious w/u is negative


See's Dr. Rushing for CARDs





#CHF with exacerbation, systolic


-Improving with increased Lasix


-Will provide additional Lasix now


#Aortic stenosis, with possibly progressive valvular disease


-He is scheduled to see Dr. Kuhn or Dr. Sparks for discussion regarding TAVR 

vs Ballon Valvuloplasty on Oct 31


-Prior to any procedural intervention, would optimize his nutrition as well as 

his deconditioning


#Indeterminate troponin, with no chest pain, no indication for acute 

intervention


#FTT: PT/OT eval pending


#SPCMN: low albumin, prealbumin, low oral intake. Nutrition/dietician to consult


#Fatigue, multifactorial


#Weakness and deconditioning, multifactorial


#chronic AC with elevated INR, Pharm to dose INR


#Hx of AFib, On Digoxin. Holding low dose BB due to borderline low BP


#Hx of Factor V Leiden with PE's


#Anemia, slight, no indication for transfusion


#elevated TB with cholelithiasis, no RUQ abd pain, neg haney's: Conservative 

mgmt


#Generalized abd discomfort and hx of GERD, improved with Protonix BID, will 

cont at current dose


#Diarrhea, non C-diff, much improved, ?resolved





Plan:


-Additional Lasix today


-PT/OT


-Nutrition/Dietician


-Will likely need Rehab


-Cards following








Full Code








Subjective: Feels SOB. Strenght is better. No CP.


Objective: 


 Vital Signs











Temp Pulse Resp BP Pulse Ox


 


 36.2 C   70   27 H  110/56 L  98 


 


 10/27/17 07:22  10/27/17 07:22  10/27/17 07:22  10/27/17 07:22  10/27/17 07:22








 Laboratory Results





 10/27/17 04:10 





 











 10/26/17 10/27/17 10/28/17





 05:59 05:59 05:59


 


Intake Total  1250 


 


Output Total   100


 


Balance  1250 -100








 











PT  33.0 SEC (12.0-15.0)  H  10/27/17  04:10    


 


INR  3.17  (0.83-1.16)  H  10/27/17  04:10    














- Physical Exam


Constitutional: no apparent distress, chronically ill appearing, cachectic


Eyes: PERRL


Ears, Nose, Mouth, Throat: moist mucous membranes


Cardiovascular: regular rate and rhythym


Respiratory: reduced air movement


Gastrointestinal: normoactive bowel sounds, soft, non-tender abdomen


Skin: warm


Musculoskeletal: generalized weakness


Neurologic: AAOx3


Psychiatric: interacting appropriately, not anxious, not encephalopathic, 

thought process linear





ICD10 Worksheet


Patient Problems: 


 Problems











Problem Status Onset


 


Elevated bilirubin Acute  


 


Elevated troponin Acute  


 


Coronary artery bypass grafting Active  


 


Hematuria syndrome Active  


 


Nausea and vomiting Active  


 


Non-healing surgical wound Active  


 


Acute combined systolic and diastolic CHF, NYHA class 3 Acute  


 


Chest pain Acute  


 


Chronic Disease Management/Transitional Care Program Acute  


 


Coronary arteriosclerosis Acute  


 


Gastrointestinal bleeding, lower Acute  


 


S/P CABG x 3 Acute
2022

## 2023-10-04 NOTE — H&P PST ADULT - NSICDXPASTMEDICALHX_GEN_ALL_CORE_FT
PAST MEDICAL HISTORY:  Calculus of kidney and ureter     Diabetes     Glaucoma     HTN (Hypertension)     Hypercholesterolemia     Renal cell carcinoma

## 2023-10-04 NOTE — H&P PST ADULT - PROBLEM SELECTOR PLAN 4
Scheduled for cystoscopy right ureteroscopy with laser lithotripsy ureteral stent placement and retrograde pyelogram.    Pt instructed to be NPO the night before and the morning of surgery except for PO med with sip of water. Provided with chlorhexidene 4% solution to wash for 3 days including the morning of surgery. Written instructions reviewed with pt and verbalized understanding. Escort required post procedure. Tylenol only to be used prior to surgery.    Stop Bang Score= 2 Pt low risk for RENETTA

## 2023-10-04 NOTE — H&P PST ADULT - PROBLEM SELECTOR PLAN 3
Pt instructed only to take Lantus the night before surgery and will check her blood glucose am of surgery.

## 2023-10-04 NOTE — H&P PST ADULT - ASSESSMENT
68 yr old pleasant female history of HTN, HDL diabetes well controlled by medication A1C 6.2 as per patient ) S/P right partial nephrectomy 3/3/2020 for renal cell carcinoma no other treatment needed. Pt had history of calculus of kidney prior now 2 weeks ago had pain in RLQ no hematuria seen by PMD. Pt scheduled for cystoscopy right ureteroscopy with laser lithotripsy ureteral stent placement and retrograde pyelogram on 10/10/2023.

## 2023-10-04 NOTE — H&P PST ADULT - NSICDXPASTSURGICALHX_GEN_ALL_CORE_FT
PAST SURGICAL HISTORY:  H/O eye surgery Right    H/O right nephrectomy     History of lithotripsy 1/2020    S/P Thyroidectomy

## 2023-10-05 LAB
CULTURE RESULTS: SIGNIFICANT CHANGE UP
SPECIMEN SOURCE: SIGNIFICANT CHANGE UP

## 2023-10-09 ENCOUNTER — TRANSCRIPTION ENCOUNTER (OUTPATIENT)
Age: 68
End: 2023-10-09

## 2023-10-10 ENCOUNTER — TRANSCRIPTION ENCOUNTER (OUTPATIENT)
Age: 68
End: 2023-10-10

## 2023-10-10 ENCOUNTER — OUTPATIENT (OUTPATIENT)
Dept: OUTPATIENT SERVICES | Facility: HOSPITAL | Age: 68
LOS: 1 days | End: 2023-10-10
Payer: COMMERCIAL

## 2023-10-10 ENCOUNTER — APPOINTMENT (OUTPATIENT)
Dept: UROLOGY | Facility: HOSPITAL | Age: 68
End: 2023-10-10

## 2023-10-10 ENCOUNTER — RESULT REVIEW (OUTPATIENT)
Age: 68
End: 2023-10-10

## 2023-10-10 VITALS
SYSTOLIC BLOOD PRESSURE: 130 MMHG | DIASTOLIC BLOOD PRESSURE: 68 MMHG | OXYGEN SATURATION: 95 % | TEMPERATURE: 98 F | RESPIRATION RATE: 16 BRPM | HEART RATE: 89 BPM

## 2023-10-10 VITALS
HEART RATE: 88 BPM | DIASTOLIC BLOOD PRESSURE: 69 MMHG | HEIGHT: 62 IN | RESPIRATION RATE: 16 BRPM | OXYGEN SATURATION: 99 % | WEIGHT: 162.04 LBS | SYSTOLIC BLOOD PRESSURE: 123 MMHG | TEMPERATURE: 99 F

## 2023-10-10 DIAGNOSIS — Z98.890 OTHER SPECIFIED POSTPROCEDURAL STATES: Chronic | ICD-10-CM

## 2023-10-10 DIAGNOSIS — Z01.818 ENCOUNTER FOR OTHER PREPROCEDURAL EXAMINATION: ICD-10-CM

## 2023-10-10 DIAGNOSIS — N20.0 CALCULUS OF KIDNEY: ICD-10-CM

## 2023-10-10 DIAGNOSIS — Z90.5 ACQUIRED ABSENCE OF KIDNEY: Chronic | ICD-10-CM

## 2023-10-10 PROBLEM — C64.9 MALIGNANT NEOPLASM OF UNSPECIFIED KIDNEY, EXCEPT RENAL PELVIS: Chronic | Status: ACTIVE | Noted: 2023-10-04

## 2023-10-10 LAB
GLUCOSE BLDC GLUCOMTR-MCNC: 103 MG/DL — HIGH (ref 70–99)
GLUCOSE BLDC GLUCOMTR-MCNC: 103 MG/DL — HIGH (ref 70–99)

## 2023-10-10 PROCEDURE — 82365 CALCULUS SPECTROSCOPY: CPT

## 2023-10-10 PROCEDURE — C1758: CPT

## 2023-10-10 PROCEDURE — C1769: CPT

## 2023-10-10 PROCEDURE — C2617: CPT

## 2023-10-10 PROCEDURE — C1889: CPT

## 2023-10-10 PROCEDURE — 74420 UROGRAPHY RTRGR +-KUB: CPT | Mod: 26

## 2023-10-10 PROCEDURE — G0463: CPT

## 2023-10-10 PROCEDURE — 88300 SURGICAL PATH GROSS: CPT | Mod: 26

## 2023-10-10 PROCEDURE — 52356 CYSTO/URETERO W/LITHOTRIPSY: CPT | Mod: RT

## 2023-10-10 PROCEDURE — 76000 FLUOROSCOPY <1 HR PHYS/QHP: CPT

## 2023-10-10 PROCEDURE — 82962 GLUCOSE BLOOD TEST: CPT

## 2023-10-10 PROCEDURE — 88300 SURGICAL PATH GROSS: CPT

## 2023-10-10 PROCEDURE — C1747: CPT

## 2023-10-10 PROCEDURE — 71046 X-RAY EXAM CHEST 2 VIEWS: CPT

## 2023-10-10 DEVICE — URETEROSCOPE LITHOVUE DISP: Type: IMPLANTABLE DEVICE | Site: RIGHT | Status: FUNCTIONAL

## 2023-10-10 DEVICE — URETERAL SHEATH NAVIGATOR HD 12/14FR X 28CM: Type: IMPLANTABLE DEVICE | Site: RIGHT | Status: FUNCTIONAL

## 2023-10-10 DEVICE — URETERAL SHEATH NAVIGATOR 11/13FR X 28CM: Type: IMPLANTABLE DEVICE | Site: RIGHT | Status: FUNCTIONAL

## 2023-10-10 DEVICE — URETERAL STENT PERCUFLEX PLUS 6FR 24CM: Type: IMPLANTABLE DEVICE | Site: RIGHT | Status: FUNCTIONAL

## 2023-10-10 DEVICE — URETERAL CATH DUAL LUMEN 10FR 54CM: Type: IMPLANTABLE DEVICE | Site: RIGHT | Status: FUNCTIONAL

## 2023-10-10 DEVICE — LASER FIBER MOSES 200 D/F/L: Type: IMPLANTABLE DEVICE | Site: RIGHT | Status: FUNCTIONAL

## 2023-10-10 DEVICE — GUIDEWIRE SENSOR DUAL-FLEX NITINOL STRAIGHT .035" X 150CM: Type: IMPLANTABLE DEVICE | Site: RIGHT | Status: FUNCTIONAL

## 2023-10-10 DEVICE — STONE BASKET ZEROTIP NITINOL 4-WIRE 1.9FR 120CM X 12MM: Type: IMPLANTABLE DEVICE | Site: RIGHT | Status: FUNCTIONAL

## 2023-10-10 DEVICE — WIRE RADIOPAQUE SUPERSTIFF 180CM: Type: IMPLANTABLE DEVICE | Site: RIGHT | Status: FUNCTIONAL

## 2023-10-10 RX ORDER — OXYCODONE HYDROCHLORIDE 5 MG/1
1 TABLET ORAL
Qty: 4 | Refills: 0
Start: 2023-10-10

## 2023-10-10 RX ORDER — ACETAMINOPHEN 500 MG
1000 TABLET ORAL ONCE
Refills: 0 | Status: DISCONTINUED | OUTPATIENT
Start: 2023-10-10 | End: 2023-10-10

## 2023-10-10 RX ORDER — HYDROMORPHONE HYDROCHLORIDE 2 MG/ML
0.5 INJECTION INTRAMUSCULAR; INTRAVENOUS; SUBCUTANEOUS
Refills: 0 | Status: DISCONTINUED | OUTPATIENT
Start: 2023-10-10 | End: 2023-10-10

## 2023-10-10 RX ORDER — MULTIVIT-MIN/FERROUS GLUCONATE 9 MG/15 ML
1 LIQUID (ML) ORAL
Qty: 0 | Refills: 0 | DISCHARGE

## 2023-10-10 RX ORDER — METFORMIN HYDROCHLORIDE 850 MG/1
1 TABLET ORAL
Qty: 0 | Refills: 0 | DISCHARGE

## 2023-10-10 RX ORDER — CIPROFLOXACIN LACTATE 400MG/40ML
1 VIAL (ML) INTRAVENOUS
Qty: 6 | Refills: 0
Start: 2023-10-10

## 2023-10-10 RX ORDER — CALCIUM CARBONATE 500(1250)
2 TABLET ORAL
Qty: 0 | Refills: 0 | DISCHARGE

## 2023-10-10 RX ORDER — GLIMEPIRIDE 1 MG
2 TABLET ORAL
Qty: 0 | Refills: 0 | DISCHARGE

## 2023-10-10 RX ORDER — AMLODIPINE BESYLATE 2.5 MG/1
1 TABLET ORAL
Qty: 0 | Refills: 0 | DISCHARGE

## 2023-10-10 RX ORDER — FENTANYL CITRATE 50 UG/ML
25 INJECTION INTRAVENOUS
Refills: 0 | Status: DISCONTINUED | OUTPATIENT
Start: 2023-10-10 | End: 2023-10-10

## 2023-10-10 RX ORDER — OXYCODONE HYDROCHLORIDE 5 MG/1
5 TABLET ORAL EVERY 4 HOURS
Refills: 0 | Status: DISCONTINUED | OUTPATIENT
Start: 2023-10-10 | End: 2023-10-10

## 2023-10-10 RX ORDER — ONDANSETRON 8 MG/1
4 TABLET, FILM COATED ORAL ONCE
Refills: 0 | Status: DISCONTINUED | OUTPATIENT
Start: 2023-10-10 | End: 2023-10-10

## 2023-10-10 RX ORDER — INSULIN GLARGINE 100 [IU]/ML
25 INJECTION, SOLUTION SUBCUTANEOUS
Qty: 0 | Refills: 0 | DISCHARGE

## 2023-10-10 RX ORDER — DORZOLAMIDE HYDROCHLORIDE TIMOLOL MALEATE 20; 5 MG/ML; MG/ML
1 SOLUTION/ DROPS OPHTHALMIC
Qty: 0 | Refills: 0 | DISCHARGE

## 2023-10-10 RX ORDER — LATANOPROST 0.05 MG/ML
1 SOLUTION/ DROPS OPHTHALMIC; TOPICAL
Qty: 0 | Refills: 0 | DISCHARGE

## 2023-10-10 RX ORDER — ATORVASTATIN CALCIUM 80 MG/1
1 TABLET, FILM COATED ORAL
Refills: 0 | DISCHARGE

## 2023-10-10 RX ORDER — BRIMONIDINE TARTRATE 2 MG/MG
1 SOLUTION/ DROPS OPHTHALMIC
Qty: 0 | Refills: 0 | DISCHARGE

## 2023-10-10 RX ORDER — CIPROFLOXACIN LACTATE 400MG/40ML
500 VIAL (ML) INTRAVENOUS EVERY 12 HOURS
Refills: 0 | Status: DISCONTINUED | OUTPATIENT
Start: 2023-10-10 | End: 2023-10-24

## 2023-10-10 RX ADMIN — SODIUM CHLORIDE 3 MILLILITER(S): 9 INJECTION INTRAMUSCULAR; INTRAVENOUS; SUBCUTANEOUS at 09:07

## 2023-10-10 NOTE — ASU PATIENT PROFILE, ADULT - REASON FOR ADMISSION, PROFILE
cystoscopy right ureteroscopy with laser lithotripsy ureteral stent placement and retrograde pyelogram

## 2023-10-10 NOTE — ASU DISCHARGE PLAN (ADULT/PEDIATRIC) - PROCEDURE
cysto right ureteroscopy and retrograde pyelogram and laser lithotripsy, stone removal and stent placement

## 2023-10-10 NOTE — ASU DISCHARGE PLAN (ADULT/PEDIATRIC) - CARE PROVIDER_API CALL
Emily Martinez [facility]  Urology  [address] [place] [address]  Phone: [phone]  Fax: [phone]  Follow Up Time:

## 2023-10-10 NOTE — ASU PATIENT PROFILE, ADULT - NS PRO LACT YNNA
[address] Gastroenterology                                                                                                  Clinic History and Physical     Pa Heart Attack Father 48   • Heart Disorder Father    • Hypertension Father    • Hypertension Mother    • Heart Attack Mother 61   • Neurological Disorder Mother 48        per NG: MS   • Heart Disease Mother         per NG: CAD- CABG at 72   • Heart Disorder moist  CV- regular rate and rhythm, the extremities are warm and well perfused   Lung- Moves air well;  No labored breathing  Abdomen- soft, non-tender exam in all quadrants without rigidity or guarding, non-distended, no abnormal bowel sounds noted, no mas advanced adenoma (pre-cancer). Correlate clinically and strongly consider a follow-up investigation with a structural examination of the colon such as with diagnostic colonoscopy. * Note: The distribution of the most significant colonoscopy findings in aver no

## 2023-10-10 NOTE — ASU PATIENT PROFILE, ADULT - FALL HARM RISK - UNIVERSAL INTERVENTIONS
Bed in lowest position, wheels locked, appropriate side rails in place/Call bell, personal items and telephone in reach/Instruct patient to call for assistance before getting out of bed or chair/Non-slip footwear when patient is out of bed/[place] to call system/Physically safe environment - no spills, clutter or unnecessary equipment/Purposeful Proactive Rounding/Room/bathroom lighting operational, light cord in reach

## 2023-10-16 ENCOUNTER — APPOINTMENT (OUTPATIENT)
Dept: UROLOGY | Facility: CLINIC | Age: 68
End: 2023-10-16
Payer: COMMERCIAL

## 2023-10-16 VITALS
OXYGEN SATURATION: 98 % | RESPIRATION RATE: 16 BRPM | HEIGHT: 62 IN | DIASTOLIC BLOOD PRESSURE: 65 MMHG | WEIGHT: 164 LBS | HEART RATE: 101 BPM | TEMPERATURE: 97.3 F | SYSTOLIC BLOOD PRESSURE: 128 MMHG | BODY MASS INDEX: 30.18 KG/M2

## 2023-10-16 PROCEDURE — 52310 CYSTOSCOPY AND TREATMENT: CPT

## 2023-10-20 LAB
CELL MATERIAL STONE EST-MCNT: SIGNIFICANT CHANGE UP
CELL MATERIAL STONE EST-MCNT: SIGNIFICANT CHANGE UP
LABORATORY COMMENT REPORT: SIGNIFICANT CHANGE UP
LABORATORY COMMENT REPORT: SIGNIFICANT CHANGE UP
NIDUS STONE QN: SIGNIFICANT CHANGE UP
NIDUS STONE QN: SIGNIFICANT CHANGE UP

## 2023-11-03 LAB
SURGICAL PATHOLOGY STUDY: SIGNIFICANT CHANGE UP
SURGICAL PATHOLOGY STUDY: SIGNIFICANT CHANGE UP

## 2023-11-30 ENCOUNTER — APPOINTMENT (OUTPATIENT)
Dept: UROLOGY | Facility: CLINIC | Age: 68
End: 2023-11-30
Payer: COMMERCIAL

## 2023-11-30 VITALS
OXYGEN SATURATION: 98 % | SYSTOLIC BLOOD PRESSURE: 127 MMHG | DIASTOLIC BLOOD PRESSURE: 78 MMHG | HEART RATE: 77 BPM | TEMPERATURE: 98 F | BODY MASS INDEX: 30.18 KG/M2 | HEIGHT: 62 IN | WEIGHT: 164 LBS

## 2023-11-30 DIAGNOSIS — R10.2 PELVIC AND PERINEAL PAIN: ICD-10-CM

## 2023-11-30 PROCEDURE — 99213 OFFICE O/P EST LOW 20 MIN: CPT

## 2023-12-01 LAB
ANION GAP SERPL CALC-SCNC: 12 MMOL/L
BUN SERPL-MCNC: 11 MG/DL
CALCIUM SERPL-MCNC: 9.3 MG/DL
CALCIUM SERPL-MCNC: 9.3 MG/DL
CHLORIDE SERPL-SCNC: 114 MMOL/L
CO2 SERPL-SCNC: 19 MMOL/L
CREAT SERPL-MCNC: 0.85 MG/DL
EGFR: 74 ML/MIN/1.73M2
GLUCOSE SERPL-MCNC: 98 MG/DL
PARATHYROID HORMONE INTACT: 37 PG/ML
POTASSIUM SERPL-SCNC: 3.7 MMOL/L
SODIUM SERPL-SCNC: 144 MMOL/L
URATE SERPL-MCNC: 4.1 MG/DL

## 2023-12-03 PROBLEM — R10.2 PELVIC PAIN: Status: RESOLVED | Noted: 2021-02-21 | Resolved: 2023-12-03

## 2023-12-20 NOTE — ASU PATIENT PROFILE, ADULT - NS PRO PASSIVE SMOKE EXP
No
FAMILY HISTORY:  Father  Still living? Unknown  Family history of heart attack, Age at diagnosis: Age Unknown

## 2024-05-30 ENCOUNTER — APPOINTMENT (OUTPATIENT)
Dept: UROLOGY | Facility: CLINIC | Age: 69
End: 2024-05-30
Payer: MEDICARE

## 2024-05-30 VITALS
TEMPERATURE: 97.3 F | SYSTOLIC BLOOD PRESSURE: 132 MMHG | OXYGEN SATURATION: 98 % | DIASTOLIC BLOOD PRESSURE: 68 MMHG | HEIGHT: 62 IN | HEART RATE: 81 BPM | BODY MASS INDEX: 30.18 KG/M2 | WEIGHT: 164 LBS

## 2024-05-30 DIAGNOSIS — C64.9 MALIGNANT NEOPLASM OF UNSPECIFIED KIDNEY, EXCEPT RENAL PELVIS: ICD-10-CM

## 2024-05-30 DIAGNOSIS — N20.0 CALCULUS OF KIDNEY: ICD-10-CM

## 2024-05-30 PROCEDURE — G2211 COMPLEX E/M VISIT ADD ON: CPT

## 2024-05-30 PROCEDURE — 99213 OFFICE O/P EST LOW 20 MIN: CPT

## 2024-05-30 RX ORDER — POTASSIUM CHLORIDE 1500 MG/1
20 TABLET, FILM COATED, EXTENDED RELEASE ORAL DAILY
Qty: 90 | Refills: 0 | Status: COMPLETED | COMMUNITY
Start: 2020-03-17 | End: 2024-05-30

## 2024-05-30 RX ORDER — SULFAMETHOXAZOLE AND TRIMETHOPRIM 800; 160 MG/1; MG/1
800-160 TABLET ORAL
Qty: 10 | Refills: 0 | Status: COMPLETED | COMMUNITY
Start: 2021-02-18 | End: 2024-05-30

## 2024-05-30 NOTE — HISTORY OF PRESENT ILLNESS
[FreeTextEntry1] : 63 yo F presented to the ER with severe right flank pain Some nausea and vomiting no fever no gross hematuria, no dysuria CT showed nephrolithiasis and potential renal mass Discharged with antibiotics Pain still there but not as severe, last took ibuprofen last night Drinks 6 bottles of water, 1 cup of coffee daily No prior history of stones  12/2/19 Interval history: Pt feeling much better since last visit Some discomfort but no need for pain meds Here to review recent CT results  2/13/20 Interval history: Doing well since stent removal Did have some flank pain for about 2 days immediately after removal Currently feeling well  6/11/20 Interval history: Pt is s/p right partial nephrectomy on 3/3/20 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 Doing well since procedure with no flank pain, gross hematuria Here today to review postop CT scan  2/18/21 Interval history: Was doing well with minimal issues until about 2 weeks ago Developed some dysuria, pelvic pain, low back pain normal bowel movements no fever, no gross hematuria  7/29/21 Interval history: Doing well since last visit No recurrence of dysuria, pelvic pain Had recent CT which showed no evidence of recurrent tumor, relatively stable nephrolithiasis  2/3/22 Interval history: doing well since last visit Had some dysuria yesterday which resolved with hydration Drinks 4-6 bottles of water daily No gross hematuria labwork from 1/24 by PCP- Cr was 0.79 hgb a1c 6.7  8/15/22 Interval history: No issues since last visit No UTI No gross hematuria No changes in health  2/16/23 Interval history: Doing well since last visit with no issues Had recent on 1/31/23 which showed increase in stone burden in right kidney, no evidence of recurrent tumor  9/15/23 Interval history: Has been having some right lower quadrant pain for a few weeks Intermittent, dull No dysuria, no gross hematuria Here today with her daughter to discuss intervention for her stones  11/30/23 Interval history: Pt is s/p right URS/HLL with subsequent stent removal Doing well since then Resolution of right sided pain  5/30/24 Interval history: No issues since last visit No recurrence of pain No urinary issues

## 2024-05-30 NOTE — ASSESSMENT
[FreeTextEntry1] : 67 yo F with renal cancer, recurrent nephrolithiasis  - 24 hr urine collection - litholink form provided - CT abdomen and pelvis in Sept for renal cancer surveillance - FU after CT

## 2024-10-31 ENCOUNTER — APPOINTMENT (OUTPATIENT)
Age: 69
End: 2024-10-31

## 2024-10-31 ENCOUNTER — NON-APPOINTMENT (OUTPATIENT)
Age: 69
End: 2024-10-31

## 2024-10-31 VITALS
SYSTOLIC BLOOD PRESSURE: 128 MMHG | HEIGHT: 62 IN | OXYGEN SATURATION: 98 % | HEART RATE: 77 BPM | BODY MASS INDEX: 29.81 KG/M2 | WEIGHT: 162 LBS | TEMPERATURE: 97.2 F | DIASTOLIC BLOOD PRESSURE: 74 MMHG

## 2024-10-31 DIAGNOSIS — R82.991 HYPOCITRATURIA: ICD-10-CM

## 2024-10-31 PROCEDURE — G2211 COMPLEX E/M VISIT ADD ON: CPT

## 2024-10-31 PROCEDURE — 99214 OFFICE O/P EST MOD 30 MIN: CPT

## 2024-10-31 RX ORDER — POTASSIUM CITRATE 10 MEQ/1
10 MEQ TABLET, EXTENDED RELEASE ORAL TWICE DAILY
Qty: 180 | Refills: 3 | Status: ACTIVE | COMMUNITY
Start: 2024-10-31 | End: 1900-01-01

## 2024-11-27 NOTE — ASU PATIENT PROFILE, ADULT - PRO ARRIVE FROM
[facility] Number Of Freeze-Thaw Cycles: 1 freeze-thaw cycle Detail Level: Simple Consent: The patient's consent was obtained including but not limited to risks of crusting, scabbing, blistering, scarring, darker or lighter pigmentary change, recurrence, incomplete removal and infection. Show Applicator Variable?: Yes Render Note In Bullet Format When Appropriate: No Post-Care Instructions: I reviewed with the patient in detail post-care instructions. Patient is to wear sunprotection, and avoid picking at any of the treated lesions. Pt may apply Vaseline to crusted or scabbing areas. Duration Of Freeze Thaw-Cycle (Seconds): 0 clears

## 2025-01-06 ENCOUNTER — APPOINTMENT (OUTPATIENT)
Dept: UROLOGY | Facility: CLINIC | Age: 70
End: 2025-01-06
Payer: COMMERCIAL

## 2025-01-06 VITALS
BODY MASS INDEX: 30.36 KG/M2 | SYSTOLIC BLOOD PRESSURE: 151 MMHG | OXYGEN SATURATION: 97 % | WEIGHT: 165 LBS | HEART RATE: 87 BPM | TEMPERATURE: 97.1 F | DIASTOLIC BLOOD PRESSURE: 79 MMHG | RESPIRATION RATE: 16 BRPM | HEIGHT: 62 IN

## 2025-01-06 DIAGNOSIS — N20.0 CALCULUS OF KIDNEY: ICD-10-CM

## 2025-01-06 DIAGNOSIS — R82.991 HYPOCITRATURIA: ICD-10-CM

## 2025-01-06 DIAGNOSIS — C64.9 MALIGNANT NEOPLASM OF UNSPECIFIED KIDNEY, EXCEPT RENAL PELVIS: ICD-10-CM

## 2025-01-06 PROCEDURE — 99214 OFFICE O/P EST MOD 30 MIN: CPT

## 2025-01-06 PROCEDURE — G2211 COMPLEX E/M VISIT ADD ON: CPT | Mod: NC

## 2025-01-07 LAB
ANION GAP SERPL CALC-SCNC: 10 MMOL/L
BUN SERPL-MCNC: 13 MG/DL
CALCIUM SERPL-MCNC: 9.7 MG/DL
CHLORIDE SERPL-SCNC: 107 MMOL/L
CO2 SERPL-SCNC: 27 MMOL/L
CREAT SERPL-MCNC: 0.82 MG/DL
EGFR: 76 ML/MIN/1.73M2
GLUCOSE SERPL-MCNC: 107 MG/DL
POTASSIUM SERPL-SCNC: 4.2 MMOL/L
SODIUM SERPL-SCNC: 144 MMOL/L

## 2025-07-28 ENCOUNTER — APPOINTMENT (OUTPATIENT)
Dept: UROLOGY | Facility: CLINIC | Age: 70
End: 2025-07-28
Payer: MEDICARE

## 2025-07-28 VITALS
OXYGEN SATURATION: 98 % | SYSTOLIC BLOOD PRESSURE: 133 MMHG | BODY MASS INDEX: 29.44 KG/M2 | TEMPERATURE: 97 F | DIASTOLIC BLOOD PRESSURE: 74 MMHG | HEART RATE: 72 BPM | HEIGHT: 62 IN | WEIGHT: 160 LBS

## 2025-07-28 DIAGNOSIS — R82.991 HYPOCITRATURIA: ICD-10-CM

## 2025-07-28 DIAGNOSIS — N20.0 CALCULUS OF KIDNEY: ICD-10-CM

## 2025-07-28 DIAGNOSIS — C64.9 MALIGNANT NEOPLASM OF UNSPECIFIED KIDNEY, EXCEPT RENAL PELVIS: ICD-10-CM

## 2025-07-28 PROCEDURE — G2211 COMPLEX E/M VISIT ADD ON: CPT

## 2025-07-28 PROCEDURE — 99214 OFFICE O/P EST MOD 30 MIN: CPT

## 2025-07-28 RX ORDER — MULTIVITAMIN/IRON/FOLIC ACID 18MG-0.4MG
TABLET ORAL
Refills: 0 | Status: ACTIVE | COMMUNITY

## 2025-07-28 RX ORDER — AMLODIPINE BESYLATE 10 MG/1
10 TABLET ORAL
Refills: 0 | Status: ACTIVE | COMMUNITY

## 2025-08-01 LAB
APPEARANCE: CLEAR
BILIRUBIN URINE: NEGATIVE
BLOOD URINE: NEGATIVE
COLOR: YELLOW
GLUCOSE QUALITATIVE U: NEGATIVE MG/DL
KETONES URINE: NEGATIVE MG/DL
LEUKOCYTE ESTERASE URINE: NEGATIVE
NITRITE URINE: NEGATIVE
PH URINE: 7.5
PROTEIN URINE: NORMAL MG/DL
SPECIFIC GRAVITY URINE: 1.01
UROBILINOGEN URINE: 0.2 MG/DL

## (undated) DEVICE — PACK CYSTO

## (undated) DEVICE — SYR LUER LOK 20CC

## (undated) DEVICE — GLV 6.5 PROTEXIS (WHITE)

## (undated) DEVICE — SOL IRR BAG NS 0.9% 3000ML

## (undated) DEVICE — VENODYNE/SCD SLEEVE CALF MEDIUM

## (undated) DEVICE — SYR LUER LOK 10CC

## (undated) DEVICE — GOWN XL

## (undated) DEVICE — DRAPE C ARM UNIVERSAL

## (undated) DEVICE — WARMING BLANKET UPPER ADULT

## (undated) DEVICE — SOL IRR POUR NS 0.9% 500ML

## (undated) DEVICE — SOL IRR POUR H2O 500ML